# Patient Record
Sex: MALE | Race: WHITE | Employment: OTHER | URBAN - NONMETROPOLITAN AREA
[De-identification: names, ages, dates, MRNs, and addresses within clinical notes are randomized per-mention and may not be internally consistent; named-entity substitution may affect disease eponyms.]

---

## 2017-04-17 DIAGNOSIS — I10 ESSENTIAL HYPERTENSION: ICD-10-CM

## 2017-04-18 RX ORDER — LISINOPRIL 10 MG/1
TABLET ORAL
Qty: 30 TABLET | Refills: 0 | Status: SHIPPED | OUTPATIENT
Start: 2017-04-18 | End: 2017-05-08 | Stop reason: SDUPTHER

## 2017-05-08 DIAGNOSIS — F41.9 ANXIETY: ICD-10-CM

## 2017-05-08 DIAGNOSIS — I10 ESSENTIAL HYPERTENSION: ICD-10-CM

## 2017-05-08 DIAGNOSIS — F32.A DEPRESSION: ICD-10-CM

## 2017-05-08 RX ORDER — LISINOPRIL 10 MG/1
TABLET ORAL
Qty: 30 TABLET | Refills: 0 | Status: SHIPPED | OUTPATIENT
Start: 2017-05-08 | End: 2017-06-12 | Stop reason: SDUPTHER

## 2017-05-08 RX ORDER — CITALOPRAM 40 MG/1
TABLET ORAL
Qty: 90 TABLET | Refills: 0 | Status: SHIPPED | OUTPATIENT
Start: 2017-05-08 | End: 2017-06-20 | Stop reason: SDUPTHER

## 2017-06-12 DIAGNOSIS — I10 ESSENTIAL HYPERTENSION: ICD-10-CM

## 2017-06-12 RX ORDER — LISINOPRIL 10 MG/1
TABLET ORAL
Qty: 30 TABLET | Refills: 0 | Status: SHIPPED | OUTPATIENT
Start: 2017-06-12 | End: 2017-06-20 | Stop reason: SDUPTHER

## 2017-06-12 RX ORDER — ATORVASTATIN CALCIUM 20 MG/1
TABLET, FILM COATED ORAL
Qty: 30 TABLET | Refills: 0 | Status: SHIPPED | OUTPATIENT
Start: 2017-06-12 | End: 2017-06-20 | Stop reason: SDUPTHER

## 2017-06-20 ENCOUNTER — OFFICE VISIT (OUTPATIENT)
Dept: PRIMARY CARE CLINIC | Age: 77
End: 2017-06-20
Payer: COMMERCIAL

## 2017-06-20 ENCOUNTER — TELEPHONE (OUTPATIENT)
Dept: PRIMARY CARE CLINIC | Age: 77
End: 2017-06-20

## 2017-06-20 VITALS
DIASTOLIC BLOOD PRESSURE: 62 MMHG | BODY MASS INDEX: 31.39 KG/M2 | HEIGHT: 72 IN | TEMPERATURE: 96.8 F | HEART RATE: 58 BPM | WEIGHT: 231.75 LBS | OXYGEN SATURATION: 97 % | SYSTOLIC BLOOD PRESSURE: 86 MMHG

## 2017-06-20 DIAGNOSIS — N40.1 BENIGN NON-NODULAR PROSTATIC HYPERPLASIA WITH LOWER URINARY TRACT SYMPTOMS: ICD-10-CM

## 2017-06-20 DIAGNOSIS — Z23 NEED FOR DTAP VACCINATION: ICD-10-CM

## 2017-06-20 DIAGNOSIS — I25.10 CORONARY ARTERY DISEASE INVOLVING NATIVE HEART WITHOUT ANGINA PECTORIS, UNSPECIFIED VESSEL OR LESION TYPE: ICD-10-CM

## 2017-06-20 DIAGNOSIS — Z23 NEED FOR PNEUMOCOCCAL VACCINATION: ICD-10-CM

## 2017-06-20 DIAGNOSIS — L82.1 SEBORRHEIC KERATOSIS: ICD-10-CM

## 2017-06-20 DIAGNOSIS — E78.2 MIXED HYPERLIPIDEMIA: ICD-10-CM

## 2017-06-20 DIAGNOSIS — F32.A DEPRESSION, UNSPECIFIED DEPRESSION TYPE: ICD-10-CM

## 2017-06-20 DIAGNOSIS — I47.1 SVT (SUPRAVENTRICULAR TACHYCARDIA) (HCC): ICD-10-CM

## 2017-06-20 DIAGNOSIS — Z95.5 S/P CORONARY ARTERY STENT PLACEMENT: ICD-10-CM

## 2017-06-20 DIAGNOSIS — F41.9 ANXIETY: ICD-10-CM

## 2017-06-20 DIAGNOSIS — Z00.00 ENCOUNTER FOR PREVENTATIVE ADULT HEALTH CARE EXAMINATION: ICD-10-CM

## 2017-06-20 DIAGNOSIS — Z00.00 ENCOUNTER FOR PREVENTATIVE ADULT HEALTH CARE EXAMINATION: Primary | ICD-10-CM

## 2017-06-20 DIAGNOSIS — I10 ESSENTIAL HYPERTENSION: ICD-10-CM

## 2017-06-20 LAB
ALBUMIN SERPL-MCNC: 4 G/DL (ref 3.5–5.2)
ALP BLD-CCNC: 95 U/L (ref 40–130)
ALT SERPL-CCNC: 17 U/L (ref 5–41)
ANION GAP SERPL CALCULATED.3IONS-SCNC: 17 MMOL/L (ref 7–19)
AST SERPL-CCNC: 18 U/L (ref 5–40)
BASOPHILS ABSOLUTE: 0.1 K/UL (ref 0–0.2)
BASOPHILS RELATIVE PERCENT: 0.7 % (ref 0–1)
BILIRUB SERPL-MCNC: 0.5 MG/DL (ref 0.2–1.2)
BUN BLDV-MCNC: 23 MG/DL (ref 8–23)
CALCIUM SERPL-MCNC: 9.2 MG/DL (ref 8.8–10.2)
CHLORIDE BLD-SCNC: 104 MMOL/L (ref 98–111)
CHOLESTEROL, TOTAL: 137 MG/DL (ref 160–199)
CO2: 20 MMOL/L (ref 22–29)
CREAT SERPL-MCNC: 0.7 MG/DL (ref 0.5–1.2)
CREATININE URINE: 234.5 MG/DL (ref 4.2–622)
EOSINOPHILS ABSOLUTE: 0.2 K/UL (ref 0–0.6)
EOSINOPHILS RELATIVE PERCENT: 2.2 % (ref 0–5)
GFR NON-AFRICAN AMERICAN: >60
GLUCOSE BLD-MCNC: 113 MG/DL (ref 74–109)
HCT VFR BLD CALC: 45.2 % (ref 42–52)
HDLC SERPL-MCNC: 45 MG/DL (ref 55–121)
HEMOGLOBIN: 15 G/DL (ref 14–18)
LDL CHOLESTEROL CALCULATED: 72 MG/DL
LYMPHOCYTES ABSOLUTE: 1.5 K/UL (ref 1.1–4.5)
LYMPHOCYTES RELATIVE PERCENT: 20.1 % (ref 20–40)
MCH RBC QN AUTO: 30.5 PG (ref 27–31)
MCHC RBC AUTO-ENTMCNC: 33.2 G/DL (ref 33–37)
MCV RBC AUTO: 91.9 FL (ref 80–94)
MICROALBUMIN UR-MCNC: <1.2 MG/DL (ref 0–19)
MICROALBUMIN/CREAT UR-RTO: NORMAL MG/G
MONOCYTES ABSOLUTE: 0.9 K/UL (ref 0–0.9)
MONOCYTES RELATIVE PERCENT: 12 % (ref 0–10)
NEUTROPHILS ABSOLUTE: 4.7 K/UL (ref 1.5–7.5)
NEUTROPHILS RELATIVE PERCENT: 64.5 % (ref 50–65)
PDW BLD-RTO: 13.2 % (ref 11.5–14.5)
PLATELET # BLD: 249 K/UL (ref 130–400)
PMV BLD AUTO: 8.9 FL (ref 9.4–12.4)
POTASSIUM SERPL-SCNC: 4.5 MMOL/L (ref 3.5–5)
RBC # BLD: 4.92 M/UL (ref 4.7–6.1)
SODIUM BLD-SCNC: 141 MMOL/L (ref 136–145)
T4 FREE: 0.9 NG/ML (ref 0.9–1.7)
TOTAL PROTEIN: 7.3 G/DL (ref 6.6–8.7)
TRIGL SERPL-MCNC: 101 MG/DL (ref 150–199)
TSH SERPL DL<=0.05 MIU/L-ACNC: 2.62 UIU/ML (ref 0.27–4.2)
WBC # BLD: 7.3 K/UL (ref 4.8–10.8)

## 2017-06-20 PROCEDURE — G0439 PPPS, SUBSEQ VISIT: HCPCS | Performed by: PEDIATRICS

## 2017-06-20 RX ORDER — CITALOPRAM 40 MG/1
40 TABLET ORAL DAILY
Qty: 90 TABLET | Refills: 3 | Status: SHIPPED | OUTPATIENT
Start: 2017-06-20 | End: 2018-08-08 | Stop reason: SDUPTHER

## 2017-06-20 RX ORDER — ATORVASTATIN CALCIUM 20 MG/1
20 TABLET, FILM COATED ORAL NIGHTLY
Qty: 90 TABLET | Refills: 3 | Status: SHIPPED | OUTPATIENT
Start: 2017-06-20 | End: 2017-07-18

## 2017-06-20 RX ORDER — FINASTERIDE 5 MG/1
5 TABLET, FILM COATED ORAL DAILY
Qty: 90 TABLET | Refills: 3 | Status: SHIPPED | OUTPATIENT
Start: 2017-06-20 | End: 2018-08-08 | Stop reason: SDUPTHER

## 2017-06-20 RX ORDER — ALFUZOSIN HYDROCHLORIDE 10 MG/1
10 TABLET, EXTENDED RELEASE ORAL DAILY
Qty: 90 TABLET | Refills: 3 | Status: CANCELLED | OUTPATIENT
Start: 2017-06-20

## 2017-06-20 RX ORDER — LISINOPRIL 10 MG/1
TABLET ORAL
Qty: 90 TABLET | Refills: 3 | Status: SHIPPED | OUTPATIENT
Start: 2017-06-20 | End: 2018-07-16 | Stop reason: SDUPTHER

## 2017-06-20 ASSESSMENT — PATIENT HEALTH QUESTIONNAIRE - PHQ9
SUM OF ALL RESPONSES TO PHQ9 QUESTIONS 1 & 2: 0
2. FEELING DOWN, DEPRESSED OR HOPELESS: 0
SUM OF ALL RESPONSES TO PHQ QUESTIONS 1-9: 0
1. LITTLE INTEREST OR PLEASURE IN DOING THINGS: 0

## 2017-06-20 ASSESSMENT — ENCOUNTER SYMPTOMS
COUGH: 0
EYE PAIN: 0
ABDOMINAL PAIN: 0
SHORTNESS OF BREATH: 0
EYE REDNESS: 0
TROUBLE SWALLOWING: 0
VOMITING: 0
SORE THROAT: 0
EYE ITCHING: 0
CONSTIPATION: 0
BLOOD IN STOOL: 0
SINUS PRESSURE: 0
DIARRHEA: 0
BACK PAIN: 0
CHEST TIGHTNESS: 0
WHEEZING: 0
NAUSEA: 0
EYE DISCHARGE: 0

## 2017-06-22 ENCOUNTER — TELEPHONE (OUTPATIENT)
Dept: PRIMARY CARE CLINIC | Age: 77
End: 2017-06-22

## 2017-06-23 ENCOUNTER — TELEPHONE (OUTPATIENT)
Dept: PRIMARY CARE CLINIC | Age: 77
End: 2017-06-23

## 2017-06-30 ENCOUNTER — TELEPHONE (OUTPATIENT)
Dept: PRIMARY CARE CLINIC | Age: 77
End: 2017-06-30

## 2017-06-30 RX ORDER — AZITHROMYCIN 250 MG/1
TABLET, FILM COATED ORAL
Qty: 1 PACKET | Refills: 0 | Status: SHIPPED | OUTPATIENT
Start: 2017-06-30 | End: 2017-07-10

## 2017-07-18 RX ORDER — ATORVASTATIN CALCIUM 20 MG/1
20 TABLET, FILM COATED ORAL DAILY
Qty: 30 TABLET | Refills: 11 | Status: SHIPPED | OUTPATIENT
Start: 2017-07-18 | End: 2018-07-10 | Stop reason: SDUPTHER

## 2017-09-18 RX ORDER — OMEPRAZOLE 40 MG/1
CAPSULE, DELAYED RELEASE ORAL
Qty: 30 CAPSULE | Refills: 11 | Status: SHIPPED | OUTPATIENT
Start: 2017-09-18 | End: 2018-09-12 | Stop reason: SDUPTHER

## 2017-09-27 ENCOUNTER — OFFICE VISIT (OUTPATIENT)
Dept: CARDIOLOGY | Age: 77
End: 2017-09-27
Payer: MEDICARE

## 2017-09-27 VITALS
SYSTOLIC BLOOD PRESSURE: 130 MMHG | DIASTOLIC BLOOD PRESSURE: 60 MMHG | HEART RATE: 60 BPM | WEIGHT: 230 LBS | HEIGHT: 72 IN | BODY MASS INDEX: 31.15 KG/M2

## 2017-09-27 DIAGNOSIS — I10 ESSENTIAL HYPERTENSION: ICD-10-CM

## 2017-09-27 DIAGNOSIS — I25.10 CORONARY ARTERY DISEASE INVOLVING NATIVE HEART WITHOUT ANGINA PECTORIS, UNSPECIFIED VESSEL OR LESION TYPE: Primary | ICD-10-CM

## 2017-09-27 DIAGNOSIS — I05.9 MITRAL VALVE DISORDER: ICD-10-CM

## 2017-09-27 DIAGNOSIS — I47.1 SVT (SUPRAVENTRICULAR TACHYCARDIA) (HCC): ICD-10-CM

## 2017-09-27 PROCEDURE — 4040F PNEUMOC VAC/ADMIN/RCVD: CPT | Performed by: CLINICAL NURSE SPECIALIST

## 2017-09-27 PROCEDURE — 1036F TOBACCO NON-USER: CPT | Performed by: CLINICAL NURSE SPECIALIST

## 2017-09-27 PROCEDURE — G8427 DOCREV CUR MEDS BY ELIG CLIN: HCPCS | Performed by: CLINICAL NURSE SPECIALIST

## 2017-09-27 PROCEDURE — 1123F ACP DISCUSS/DSCN MKR DOCD: CPT | Performed by: CLINICAL NURSE SPECIALIST

## 2017-09-27 PROCEDURE — 99213 OFFICE O/P EST LOW 20 MIN: CPT | Performed by: CLINICAL NURSE SPECIALIST

## 2017-09-27 PROCEDURE — 93000 ELECTROCARDIOGRAM COMPLETE: CPT | Performed by: CLINICAL NURSE SPECIALIST

## 2017-09-27 PROCEDURE — G8598 ASA/ANTIPLAT THER USED: HCPCS | Performed by: CLINICAL NURSE SPECIALIST

## 2017-09-27 PROCEDURE — G8417 CALC BMI ABV UP PARAM F/U: HCPCS | Performed by: CLINICAL NURSE SPECIALIST

## 2017-09-27 ASSESSMENT — ENCOUNTER SYMPTOMS
BLURRED VISION: 0
COUGH: 0
HEARTBURN: 0
NAUSEA: 0
ORTHOPNEA: 0
VOMITING: 0
SHORTNESS OF BREATH: 0

## 2017-12-04 RX ORDER — METOPROLOL SUCCINATE 50 MG/1
25 TABLET, EXTENDED RELEASE ORAL DAILY
Qty: 30 TABLET | Refills: 10 | Status: SHIPPED | OUTPATIENT
Start: 2017-12-04 | End: 2017-12-04 | Stop reason: SDUPTHER

## 2017-12-04 RX ORDER — METOPROLOL SUCCINATE 50 MG/1
25 TABLET, EXTENDED RELEASE ORAL DAILY
Qty: 30 TABLET | Refills: 10 | Status: SHIPPED | OUTPATIENT
Start: 2017-12-04 | End: 2019-01-16 | Stop reason: SDUPTHER

## 2018-06-06 ENCOUNTER — APPOINTMENT (OUTPATIENT)
Dept: GENERAL RADIOLOGY | Age: 78
End: 2018-06-06
Payer: MEDICARE

## 2018-06-06 ENCOUNTER — HOSPITAL ENCOUNTER (EMERGENCY)
Age: 78
Discharge: HOME | End: 2018-06-06
Attending: EMERGENCY MEDICINE
Payer: MEDICARE

## 2018-06-06 VITALS
TEMPERATURE: 98.2 F | WEIGHT: 225 LBS | BODY MASS INDEX: 29.82 KG/M2 | HEART RATE: 79 BPM | SYSTOLIC BLOOD PRESSURE: 105 MMHG | RESPIRATION RATE: 20 BRPM | OXYGEN SATURATION: 92 % | DIASTOLIC BLOOD PRESSURE: 85 MMHG | HEIGHT: 73 IN

## 2018-06-06 DIAGNOSIS — R00.2 PALPITATIONS: Primary | ICD-10-CM

## 2018-06-06 DIAGNOSIS — R07.9 CHEST PAIN, UNSPECIFIED TYPE: ICD-10-CM

## 2018-06-06 LAB
ANION GAP SERPL CALCULATED.3IONS-SCNC: 13 MMOL/L (ref 7–19)
APTT: 24.8 SEC (ref 26–36.2)
BUN BLDV-MCNC: 27 MG/DL (ref 8–23)
CALCIUM SERPL-MCNC: 9.5 MG/DL (ref 8.8–10.2)
CHLORIDE BLD-SCNC: 104 MMOL/L (ref 98–111)
CO2: 27 MMOL/L (ref 22–29)
CREAT SERPL-MCNC: 0.8 MG/DL (ref 0.5–1.2)
GFR NON-AFRICAN AMERICAN: >60
GLUCOSE BLD-MCNC: 115 MG/DL (ref 74–109)
HCT VFR BLD CALC: 48.8 % (ref 42–52)
HEMOGLOBIN: 15.7 G/DL (ref 14–18)
INR BLD: 0.98 (ref 0.88–1.18)
MCH RBC QN AUTO: 29.3 PG (ref 27–31)
MCHC RBC AUTO-ENTMCNC: 32.2 G/DL (ref 33–37)
MCV RBC AUTO: 91.2 FL (ref 80–94)
PDW BLD-RTO: 13.2 % (ref 11.5–14.5)
PERFORMED ON: NORMAL
PERFORMED ON: NORMAL
PLATELET # BLD: 290 K/UL (ref 130–400)
PMV BLD AUTO: 8.7 FL (ref 9.4–12.4)
POC TROPONIN I: 0.01 NG/ML (ref 0–0.08)
POC TROPONIN I: 0.02 NG/ML (ref 0–0.08)
POTASSIUM SERPL-SCNC: 4.9 MMOL/L (ref 3.5–5)
PROTHROMBIN TIME: 12.9 SEC (ref 12–14.6)
RBC # BLD: 5.35 M/UL (ref 4.7–6.1)
SODIUM BLD-SCNC: 144 MMOL/L (ref 136–145)
WBC # BLD: 9.6 K/UL (ref 4.8–10.8)

## 2018-06-06 PROCEDURE — 84484 ASSAY OF TROPONIN QUANT: CPT

## 2018-06-06 PROCEDURE — 85610 PROTHROMBIN TIME: CPT

## 2018-06-06 PROCEDURE — 93005 ELECTROCARDIOGRAM TRACING: CPT

## 2018-06-06 PROCEDURE — 99285 EMERGENCY DEPT VISIT HI MDM: CPT

## 2018-06-06 PROCEDURE — 71046 X-RAY EXAM CHEST 2 VIEWS: CPT

## 2018-06-06 PROCEDURE — 85027 COMPLETE CBC AUTOMATED: CPT

## 2018-06-06 PROCEDURE — 80048 BASIC METABOLIC PNL TOTAL CA: CPT

## 2018-06-06 PROCEDURE — 85730 THROMBOPLASTIN TIME PARTIAL: CPT

## 2018-06-06 PROCEDURE — 99284 EMERGENCY DEPT VISIT MOD MDM: CPT | Performed by: EMERGENCY MEDICINE

## 2018-06-06 PROCEDURE — 6370000000 HC RX 637 (ALT 250 FOR IP): Performed by: EMERGENCY MEDICINE

## 2018-06-06 PROCEDURE — 36415 COLL VENOUS BLD VENIPUNCTURE: CPT

## 2018-06-06 RX ORDER — ASPIRIN 81 MG/1
324 TABLET, CHEWABLE ORAL ONCE
Status: COMPLETED | OUTPATIENT
Start: 2018-06-06 | End: 2018-06-06

## 2018-06-06 RX ADMIN — ASPIRIN 81 MG 324 MG: 81 TABLET ORAL at 19:25

## 2018-06-06 ASSESSMENT — ENCOUNTER SYMPTOMS
COUGH: 1
VOMITING: 0
CONSTIPATION: 0
RHINORRHEA: 0
SORE THROAT: 0
ABDOMINAL PAIN: 0
CHEST TIGHTNESS: 0
BACK PAIN: 0
BLOOD IN STOOL: 0
NAUSEA: 0
DIARRHEA: 0
SHORTNESS OF BREATH: 0
TROUBLE SWALLOWING: 0
ABDOMINAL DISTENTION: 0

## 2018-06-06 ASSESSMENT — HEART SCORE: ECG: 1

## 2018-06-08 ENCOUNTER — TELEPHONE (OUTPATIENT)
Dept: PRIMARY CARE CLINIC | Age: 78
End: 2018-06-08

## 2018-06-09 LAB
EKG P AXIS: 23 DEGREES
EKG P AXIS: NORMAL DEGREES
EKG P-R INTERVAL: 248 MS
EKG P-R INTERVAL: NORMAL MS
EKG Q-T INTERVAL: 406 MS
EKG Q-T INTERVAL: 414 MS
EKG QRS DURATION: 90 MS
EKG QRS DURATION: 90 MS
EKG QTC CALCULATION (BAZETT): 433 MS
EKG QTC CALCULATION (BAZETT): 435 MS
EKG T AXIS: 43 DEGREES
EKG T AXIS: 51 DEGREES

## 2018-07-12 RX ORDER — ATORVASTATIN CALCIUM 20 MG/1
TABLET, FILM COATED ORAL
Qty: 30 TABLET | Refills: 0 | Status: SHIPPED | OUTPATIENT
Start: 2018-07-12 | End: 2018-08-08 | Stop reason: SDUPTHER

## 2018-07-16 DIAGNOSIS — I10 ESSENTIAL HYPERTENSION: ICD-10-CM

## 2018-07-16 RX ORDER — LISINOPRIL 10 MG/1
TABLET ORAL
Qty: 30 TABLET | Refills: 0 | Status: SHIPPED | OUTPATIENT
Start: 2018-07-16 | End: 2018-08-08 | Stop reason: SDUPTHER

## 2018-08-01 ENCOUNTER — OFFICE VISIT (OUTPATIENT)
Dept: PRIMARY CARE CLINIC | Age: 78
End: 2018-08-01
Payer: MEDICARE

## 2018-08-01 VITALS
WEIGHT: 230 LBS | HEART RATE: 54 BPM | DIASTOLIC BLOOD PRESSURE: 72 MMHG | HEIGHT: 72 IN | SYSTOLIC BLOOD PRESSURE: 136 MMHG | BODY MASS INDEX: 31.15 KG/M2 | TEMPERATURE: 97.7 F | OXYGEN SATURATION: 97 %

## 2018-08-01 DIAGNOSIS — I48.0 PAROXYSMAL ATRIAL FIBRILLATION (HCC): ICD-10-CM

## 2018-08-01 DIAGNOSIS — N40.1 BENIGN PROSTATIC HYPERPLASIA WITH URINARY HESITANCY: ICD-10-CM

## 2018-08-01 DIAGNOSIS — F41.9 ANXIETY: ICD-10-CM

## 2018-08-01 DIAGNOSIS — Z95.5 S/P CORONARY ARTERY STENT PLACEMENT: ICD-10-CM

## 2018-08-01 DIAGNOSIS — R39.11 BENIGN PROSTATIC HYPERPLASIA WITH URINARY HESITANCY: ICD-10-CM

## 2018-08-01 DIAGNOSIS — Z00.00 VISIT FOR PREVENTIVE HEALTH EXAMINATION: Primary | ICD-10-CM

## 2018-08-01 DIAGNOSIS — I25.10 CORONARY ARTERY DISEASE INVOLVING NATIVE CORONARY ARTERY OF NATIVE HEART WITHOUT ANGINA PECTORIS: ICD-10-CM

## 2018-08-01 DIAGNOSIS — K21.9 GASTROESOPHAGEAL REFLUX DISEASE, ESOPHAGITIS PRESENCE NOT SPECIFIED: ICD-10-CM

## 2018-08-01 DIAGNOSIS — I47.1 SVT (SUPRAVENTRICULAR TACHYCARDIA) (HCC): ICD-10-CM

## 2018-08-01 DIAGNOSIS — I10 ESSENTIAL HYPERTENSION: ICD-10-CM

## 2018-08-01 DIAGNOSIS — E78.2 MIXED HYPERLIPIDEMIA: ICD-10-CM

## 2018-08-01 PROCEDURE — 4040F PNEUMOC VAC/ADMIN/RCVD: CPT | Performed by: PEDIATRICS

## 2018-08-01 PROCEDURE — G8598 ASA/ANTIPLAT THER USED: HCPCS | Performed by: PEDIATRICS

## 2018-08-01 PROCEDURE — 1101F PT FALLS ASSESS-DOCD LE1/YR: CPT | Performed by: PEDIATRICS

## 2018-08-01 PROCEDURE — 1123F ACP DISCUSS/DSCN MKR DOCD: CPT | Performed by: PEDIATRICS

## 2018-08-01 PROCEDURE — G8417 CALC BMI ABV UP PARAM F/U: HCPCS | Performed by: PEDIATRICS

## 2018-08-01 PROCEDURE — 93000 ELECTROCARDIOGRAM COMPLETE: CPT | Performed by: PEDIATRICS

## 2018-08-01 PROCEDURE — G0439 PPPS, SUBSEQ VISIT: HCPCS | Performed by: PEDIATRICS

## 2018-08-01 PROCEDURE — 99214 OFFICE O/P EST MOD 30 MIN: CPT | Performed by: PEDIATRICS

## 2018-08-01 PROCEDURE — G8427 DOCREV CUR MEDS BY ELIG CLIN: HCPCS | Performed by: PEDIATRICS

## 2018-08-01 PROCEDURE — 1036F TOBACCO NON-USER: CPT | Performed by: PEDIATRICS

## 2018-08-01 ASSESSMENT — ENCOUNTER SYMPTOMS
EYE PAIN: 0
NAUSEA: 0
DIARRHEA: 0
CONSTIPATION: 0
COUGH: 0
WHEEZING: 0
SINUS PRESSURE: 0
EYE ITCHING: 0
CHEST TIGHTNESS: 0
EYE REDNESS: 0
SHORTNESS OF BREATH: 0
BACK PAIN: 0
VOMITING: 0
ABDOMINAL PAIN: 0
SORE THROAT: 0
EYE DISCHARGE: 0
TROUBLE SWALLOWING: 0
BLOOD IN STOOL: 0

## 2018-08-01 ASSESSMENT — PATIENT HEALTH QUESTIONNAIRE - PHQ9: SUM OF ALL RESPONSES TO PHQ QUESTIONS 1-9: 0

## 2018-08-01 ASSESSMENT — LIFESTYLE VARIABLES: HOW OFTEN DO YOU HAVE A DRINK CONTAINING ALCOHOL: 0

## 2018-08-01 ASSESSMENT — ANXIETY QUESTIONNAIRES: GAD7 TOTAL SCORE: 0

## 2018-08-01 NOTE — PATIENT INSTRUCTIONS
Personalized Preventive Plan for Adelfo Angeles - 8/1/2018  Medicare offers a range of preventive health benefits. Some of the tests and screenings are paid in full while other may be subject to a deductible, co-insurance, and/or copay. Some of these benefits include a comprehensive review of your medical history including lifestyle, illnesses that may run in your family, and various assessments and screenings as appropriate. After reviewing your medical record and screening and assessments performed today your provider may have ordered immunizations, labs, imaging, and/or referrals for you. A list of these orders (if applicable) as well as your Preventive Care list are included within your After Visit Summary for your review. Other Preventive Recommendations:    · A preventive eye exam performed by an eye specialist is recommended every 1-2 years to screen for glaucoma; cataracts, macular degeneration, and other eye disorders. · A preventive dental visit is recommended every 6 months. · Try to get at least 150 minutes of exercise per week or 10,000 steps per day on a pedometer . · Order or download the FREE \"Exercise & Physical Activity: Your Everyday Guide\" from [employer]. Call 4-280.793.1003 or search [employer] online. · You need 7077-6440 mg of calcium and 4096-9161 IU of vitamin D per day. It is possible to meet your calcium requirement with diet alone, but a vitamin D supplement is usually necessary to meet this goal.  · When exposed to the sun, use a sunscreen that protects against both UVA and UVB radiation with an SPF of 30 or greater. Reapply every 2 to 3 hours or after sweating, drying off with a towel, or swimming. · Always wear a seat belt when traveling in a car. Always wear a helmet when riding a bicycle or motorcycle.   Patient Education        Well Visit, Over 72: Care Instructions  Your Care Instructions    Physical exams can help you Your electronic copy will then be available wherever you have a connection to the Internet. In most cases, doctors will respect your wishes even if you have a form from a different state. You don't need an  to complete a living will. But legal advice can be helpful if your state's laws are unclear, your health history is complicated, or your family can't agree on what should be in your living will. You can change your living will at any time. Some people find that their wishes about end-of-life care change as their health changes. In addition to making a living will, think about completing a medical power of  form. This form lets you name the person you want to make end-of-life treatment decisions for you (your \"health care agent\") if you're not able to. Many hospitals and nursing homes will give you the forms you need to complete a living will and a medical power of . Your living will is used only if you can't make or communicate decisions for yourself anymore. If you become able to make decisions again, you can accept or refuse any treatment, no matter what you wrote in your living will. Your state may offer an online registry. This is a place where you can store your living will online so the doctors and nurses who need to treat you can find it right away. What should you think about when creating a living will? Talk about your end-of-life wishes with your family members and your doctor. Let them know what you want. That way the people making decisions for you won't be surprised by your choices. Think about these questions as you make your living will:  Do you know enough about life support methods that might be used? If not, talk to your doctor so you know what might be done if you can't breathe on your own, your heart stops, or you're unable to swallow. What things would you still want to be able to do after you receive life-support methods? Would you want to be able to walk?  To speak? To eat on your own? To live without the help of machines? If you have a choice, where do you want to be cared for? In your home? At a hospital or nursing home? Do you want certain Zoroastrianism practices performed if you become very ill? If you have a choice at the end of your life, where would you prefer to die? At home? In a hospital or nursing home? Somewhere else? Would you prefer to be buried or cremated? Do you want your organs to be donated after you die? What should you do with your living will? Make sure that your family members and your health care agent have copies of your living will. Give your doctor a copy of your living will to keep in your medical record. If you have more than one doctor, make sure that each one has a copy. You may want to put a copy of your living will where it can be easily found. Where can you learn more? Go to https://Data ExpeditionpeHOLLR.Fooala. org and sign in to your Netflix account. Enter M230 in the [employer] box to learn more about \"Learning About Living Perrokatya. \"     If you do not have an account, please click on the \"Sign Up Now\" link. Current as of: October 6, 2017  Content Version: 11.6  © 2657-8279 [employer]. Care instructions adapted under license by [facility] ([facility]). If you have questions about a medical condition or this instruction, always ask your healthcare professional. [place] [address] any warranty or liability for your use of this information. Patient Education        Advance Directives: Care Instructions  Your Care Instructions  An advance directive is a legal way to state your wishes at the end of your life. It tells your family and your doctor what to do if you can no longer say what you want. There are two main types of advance directives. You can change them any time that your wishes change.   A living will tells your family and your doctor your wishes about life support and other treatment. A durable power of  for health care lets you name a person to make treatment decisions for you when you can't speak for yourself. This person is called a health care agent. If you do not have an advance directive, decisions about your medical care may be made by a doctor or a  who doesn't know you. It may help to think of an advance directive as a gift to the people who care for you. If you have one, they won't have to make tough decisions by themselves. Follow-up care is a key part of your treatment and safety. Be sure to make and go to all appointments, and call your doctor if you are having problems. It's also a good idea to know your test results and keep a list of the medicines you take. How can you care for yourself at home? Discuss your wishes with your loved ones and your doctor. This way, there are no surprises. Many states have a unique form. Or you might use a universal form that has been approved by many states. This kind of form can sometimes be completed and stored online. Your electronic copy will then be available wherever you have a connection to the Internet. In most cases, doctors will respect your wishes even if you have a form from a different state. You don't need a  to do an advance directive. But you may want to get legal advice. Think about these questions when you prepare an advance directive: Who do you want to make decisions about your medical care if you are not able to? Many people choose a family member or close friend. Do you know enough about life support methods that might be used? If not, talk to your doctor so you understand. What are you most afraid of that might happen? You might be afraid of having pain, losing your independence, or being kept alive by machines. Where would you prefer to die? Choices include your home, a hospital, or a nursing home.   Would you like to have information about hospice care to support you and your and have more energy to do all the things you like to do. Focus better at school or work and perform better in sports. Feel, think, and sleep better. Reach and stay at a healthy weight. Lose fat and build lean muscle. Lower your risk for serious health problems. Keep your bones, muscles, and joints strong. Being fit lets you do more physical activity. And it lets you work out harder without as much effort. How can you make physical activity part of your life? Get at least 30 minutes of exercise on most days of the week. Walking is a good choice. You also may want to do other activities, such as running, swimming, cycling, or playing tennis or team sports. Pick activities that you like-ones that make your heart beat faster, your muscles stronger, and your muscles and joints more flexible. If you find more than one thing you like doing, do them all. You don't have to do the same thing every day. Get your heart pumping every day. Any activity that makes your heart beat faster and keeps it at that rate for a while counts. Here are some great ways to get your heart beating faster:  Go for a brisk walk, run, or bike ride. Go for a hike or swim. Go in-line skating. Play a game of touch football, basketball, or soccer. Ride a bike. Play tennis or racquetball. Climb stairs. Even some household chores can be aerobic-just do them at a faster pace. Vacuuming, raking or mowing the lawn, sweeping the garage, and washing and waxing the car all can help get your heart rate up. Strengthen your muscles during the week. You don't have to lift heavy weights or grow big, bulky muscles to get stronger. Doing a few simple activities that make your muscles work against, or \"resist,\" something can help you get stronger. For example, you can:  Do push-ups or sit-ups, which use your own body weight as resistance. Lift weights or dumbbells or use stretch bands at home or in a gym or community center.   Stretch your muscles information. Patient Education        Learning About Hearing Aids  What is a hearing aid? A hearing aid makes sounds louder. It can help some people with hearing problems to hear better. Hearing aids do not restore normal hearing. But they can make it easier to communicate. There are different types of hearing aids. Analog adjustable hearing aids make both speech and other sounds louder in the same amount. Your doctor can adjust them to fit your hearing. You can control loudness. These cost less than the other types of hearing aids. Analog programmable hearing aids have a computer chip that your doctor can program to fit your hearing. They can be set up for different places or events. For example, you can have a setting for quiet one-on-one conversations and another for noisy times like a dinner party in a restaurant. You can change hearing programs with a remote control. Digital programmable hearing aids can adjust themselves to work best where you are at any time. You also have more choices in setting them up than with analog hearing aids. Bone-anchored hearing systems transmit sound through the skull. This type of hearing aid is permanently implanted in the skull bone. It may work for people who do not benefit from other types of hearing aids. There are also different styles of hearing aids. A behind-the-ear (BTE) hearing aid connects to a plastic ear mold that fits inside the outer ear. BTE hearing aids are used for all levels of hearing loss, especially very severe hearing loss. They may be better for children for safety and growth reasons. Poorly fitting BTE ear molds or a buildup of earwax may cause a whistling sound (feedback). An in-the-ear (ITE) hearing aid fits in the outer part of the ear. It can be used by people with mild to severe hearing loss. ITE hearing aids can be used with other hearing devices, such as a telecoil that improves hearing during phone calls.  ITE hearing aids can be damaged by earwax and fluid draining from the ear. Their small size may be hard for some people to handle. They are not often used in children because the case must be replaced as the child grows. An in-the-canal (ITC) hearing aid fits into the ear canal. ITC hearing aids are used by people with mild to moderate hearing loss. They are made to fit the shape and the size of your ear canal. They can be damaged by earwax and fluid draining from the ear. Their small size may be hard for some people to handle. They are not made for children. With a bone-anchored hearing system, the sound processor sits behind the ear. No part of the system is within the ear itself. If your doctor thinks that you have hearing loss, you will be referred to an audiologist to do hearing tests. He or she can help you decide what type and style of hearing aid may be best for you. What else should I know about hearing aids? Find out if your insurance covers hearing aids. They can be expensive. Different types of hearing aids come with different costs. Also find out about a warranty or return policy in case you are not happy with your hearing aids. Follow-up care is a key part of your treatment and safety. Be sure to make and go to all appointments, and call your doctor if you are having problems. It's also a good idea to know your test results and keep a list of the medicines you take. Where can you learn more? Go to https://Cloud.CMpedelilahewAfterCollege.Paylocity. org and sign in to your ID90T account. Enter W706 in the [facility] box to learn more about \"Learning About Hearing Aids. \"     If you do not have an account, please click on the \"Sign Up Now\" link. Current as of: May 12, 2017  Content Version: 11.6  © 5617-8290 [employer]. Care instructions adapted under license by [facility] ([facility]).  If you have questions about a medical condition or this instruction, always ask your healthcare professional. Ranjeet Loomis, [facility] disclaims any warranty or liability for your use of this information. Patient Education        Learning About Vision Tests  What are vision tests? The four most common vision tests are visual acuity tests, refraction, visual field tests, and color vision tests. Visual acuity (sharpness) tests are used: To see if you need glasses or contact lenses. To monitor an eye problem. To check an eye injury. Visual acuity tests are done as part of routine exams. You may also have this test when you get your 's license or apply for some types of jobs. Refraction is done: To find the right prescription for glasses and contact lenses. Visual field tests are used: To check for vision loss in any area of your range of vision. To screen for certain eye diseases. To look for nerve damage after a stroke, head injury, or other problem that could reduce blood flow to the brain. Color vision tests are used: To check for color blindness. Color vision is often tested as part of a routine exam. You may also have this test when you apply for a job where recognizing different colors is important, such as , electronics, or the [employer]. How are vision tests done? Visual acuity test  You cover one eye at a time. You read aloud from a chart across the room. You read aloud from a small card that you hold in your hand. Refraction  You look into a special device. The device puts lenses of different strengths in front of each eye to see how strong your glasses or contact lenses need to be. Visual field tests  Your doctor may have you look through special machines. Or your doctor may simply have you stare straight ahead while he or she moves a finger into and out of your field of vision. Color vision test  You look at pieces of printed test patterns in various colors. You say what number or symbol you see. Your doctor may have you trace the number or symbol using a pointer.   How do these tests feel? You shouldn't feel any discomfort during these tests. Follow-up care is a key part of your treatment and safety. Be sure to make and go to all appointments, and call your doctor if you are having problems. It's also a good idea to know your test results and keep a list of the medicines you take. Where can you learn more? Go to https://chpepiceweb.IQcard. org and sign in to your [employer] account. Enter G551 in the [facility] box to learn more about \"Learning About Vision Tests. \"     If you do not have an account, please click on the \"Sign Up Now\" link. Current as of: December 3, 2017  Content Version: 11.6  © 6886-7283 [employer]. Care instructions adapted under license by [facility] ([facility]). If you have questions about a medical condition or this instruction, always ask your healthcare professional. [address] any warranty or liability for your use of this information. Patient Education        Reduced Vision: Care Instructions  Your Care Instructions    Reduced vision can be caused by many things. These include macular degeneration and glaucoma. When you can't see as well, daily life can be more challenging. But you can do some things to stay independent and keep doing the activities you enjoy. Follow-up care is a key part of your treatment and safety. Be sure to make and go to all appointments, and call your doctor if you are having problems. It's also a good idea to know your test results and keep a list of the medicines you take. How can you care for yourself at home? Use lighting  Point lighting at what you want to see. Don't point it at your eyes. Add lamps where you need extra lighting. Use curtains or shades to adjust how much natural light there is. Use good lighting in places where you could easily fall. These include entries and stairways.   Use labels  Label things that are hard to recognize or that could be

## 2018-08-01 NOTE — PROGRESS NOTES
(supraventricular tachycardia) ([facility] [address]      Past Surgical History:   Procedure Laterality Date    APPENDECTOMY      CARDIAC CATHETERIZATION  10/3/11    CARDIAC CATHETERIZATION  9/1/1987    Normal left ventricular function and hemodynamics , Normal coronary arteriograms    CHOLECYSTECTOMY      CORONARY ANGIOPLASTY WITH STENT PLACEMENT      DIAGNOSTIC CARDIAC CATH LAB PROCEDURE  3/24/10    EF over 60%  Normal left ventricular function and hemodynamics, Diffuse nonocclusive CAD , w/ a patent stent in the circumflex marginal branch    DIAGNOSTIC CARDIAC CATH LAB PROCEDURE  10/28/08    EF 60% Normal LV function and hemodynamics, Diffuse non-occlusive CAD     DIAGNOSTIC CARDIAC CATH LAB PROCEDURE  5/8/07    EF over 60% Normal LV chamber size and wall motion, Diffuse nonocclusive CAD     DIAGNOSTIC CARDIAC CATH LAB PROCEDURE  4/11/06    EF over 60%  Normal LV function and hemodynamics, Severe two- vessel CAD , Successful percutaneous coronary intevention w/cuttng balloon angioplasty to a small second circumflex marginal branch and primary stent placement using a drug eluting stent to the diagonal branch of the LAD     DIAGNOSTIC CARDIAC CATH LAB PROCEDURE  9/9/03    EF over 60% Normal LV function and hemodynamics Mild diffuse nonocclusive CAD w/o hemodynamically significant lesions identified        Family History   Problem Relation Age of Onset    Hypertension Mother     Cancer Father     Diabetes Brother        CareTeam (Including outside providers/suppliers regularly involved in providing care):   Patient Care Team:  Ashanti Pérez DO as PCP - General  RICARDO Salazar MD (Cardiology)    Wt Readings from Last 3 Encounters:   08/01/18 230 lb (104.3 kg)   06/06/18 225 lb (102.1 kg)   09/27/17 230 lb (104.3 kg)     Vitals:    08/01/18 0722   BP: 136/72   Site: Left Arm   Position: Sitting   Cuff Size: Large Adult   Pulse: 54   Temp: 97.7 °F (36.5 °C)   TempSrc: Temporal   SpO2: 97%   Weight: 230 lb (104.3 kg)   Height: 6' (1.829 m)       Physical exam is documented elsewhere in a separate note. Patient's complete Health Risk Assessment and screening values have been reviewed and are found in Flowsheets. The following problems were reviewed today and where indicated follow up appointments were made and/or referrals ordered. Positive Risk Factor Screenings with Interventions:     General Health:  General  In general, how would you say your health is?: Fair  In the past 7 days, have you experienced any of the following?: None of These  Do you get the social and emotional support that you need?: Yes  Do you have a Living Will?: (!) No  General Health Risk Interventions:  · No Living Will: additional information provided    Health Habits/Nutrition:  Health Habits/Nutrition  Do you exercise for at least 20 minutes 2-3 times per week?: (!) No  Have you lost any weight without trying in the past 3 months?: No  Do you eat fewer than 2 meals per day?: No  Have you seen a dentist within the past year?: Yes ([facility])  Body mass index is 31.19 kg/m². Health Habits/Nutrition Interventions:  · Inadequate physical activity:  educational materials provided to promote increased physical activity    Hearing/Vision:  Hearing/Vision  Do you or your family notice any trouble with your hearing?: (!) Yes  Do you have difficulty driving, watching TV, or doing any of your daily activities because of your eyesight?: (!) Yes  Have you had an eye exam within the past year?: Yes  Hearing/Vision Interventions:  · Hearing concerns:   Additional information was provided  · Vision concerns:  patient encouraged to make appointment with his/her eye specialist    Personalized Preventive Plan   Current Health Maintenance Status  Immunization History   Administered Date(s) Administered    Influenza Vaccine, unspecified formulation 11/01/2015    Pneumococcal 13-valent Conjugate (Iyntion10) 10/12/2016    Pneumococcal Polysaccharide

## 2018-08-03 DIAGNOSIS — Z95.5 S/P CORONARY ARTERY STENT PLACEMENT: ICD-10-CM

## 2018-08-03 DIAGNOSIS — F41.9 ANXIETY: ICD-10-CM

## 2018-08-03 DIAGNOSIS — I47.1 SVT (SUPRAVENTRICULAR TACHYCARDIA) (HCC): Primary | ICD-10-CM

## 2018-08-03 DIAGNOSIS — F32.A DEPRESSION, UNSPECIFIED DEPRESSION TYPE: ICD-10-CM

## 2018-08-03 NOTE — TELEPHONE ENCOUNTER
We can always do Coumadin  We'll start with 5 mg daily  Dispense #30 with 2 refills  Recommend check PT INR in 3 days.   Will likely require frequent checking for the next couple of weeks until we get him dialed in on the appropriate dose

## 2018-08-08 DIAGNOSIS — N40.1 BENIGN NON-NODULAR PROSTATIC HYPERPLASIA WITH LOWER URINARY TRACT SYMPTOMS: ICD-10-CM

## 2018-08-08 DIAGNOSIS — I10 ESSENTIAL HYPERTENSION: ICD-10-CM

## 2018-08-08 RX ORDER — CITALOPRAM 40 MG/1
40 TABLET ORAL DAILY
Qty: 90 TABLET | Refills: 3 | Status: SHIPPED | OUTPATIENT
Start: 2018-08-08 | End: 2019-07-26 | Stop reason: SDUPTHER

## 2018-08-08 RX ORDER — FINASTERIDE 5 MG/1
TABLET, FILM COATED ORAL
Qty: 90 TABLET | Refills: 3 | Status: SHIPPED | OUTPATIENT
Start: 2018-08-08 | End: 2019-07-26 | Stop reason: SDUPTHER

## 2018-08-08 RX ORDER — LISINOPRIL 10 MG/1
TABLET ORAL
Qty: 90 TABLET | Refills: 3 | Status: SHIPPED | OUTPATIENT
Start: 2018-08-08 | End: 2019-08-03 | Stop reason: SDUPTHER

## 2018-08-08 RX ORDER — ATORVASTATIN CALCIUM 20 MG/1
TABLET, FILM COATED ORAL
Qty: 30 TABLET | Refills: 11 | Status: SHIPPED | OUTPATIENT
Start: 2018-08-08 | End: 2019-07-26 | Stop reason: SDUPTHER

## 2018-08-09 RX ORDER — WARFARIN SODIUM 5 MG/1
5 TABLET ORAL DAILY
Qty: 30 TABLET | Refills: 2 | Status: SHIPPED | OUTPATIENT
Start: 2018-08-09 | End: 2018-09-18 | Stop reason: SDUPTHER

## 2018-08-13 DIAGNOSIS — Z95.5 S/P CORONARY ARTERY STENT PLACEMENT: ICD-10-CM

## 2018-08-13 DIAGNOSIS — I47.1 SVT (SUPRAVENTRICULAR TACHYCARDIA) (HCC): ICD-10-CM

## 2018-08-13 DIAGNOSIS — Z00.00 VISIT FOR PREVENTIVE HEALTH EXAMINATION: ICD-10-CM

## 2018-08-13 DIAGNOSIS — I10 ESSENTIAL HYPERTENSION: ICD-10-CM

## 2018-08-13 DIAGNOSIS — E78.2 MIXED HYPERLIPIDEMIA: ICD-10-CM

## 2018-08-13 LAB
ALBUMIN SERPL-MCNC: 4.2 G/DL (ref 3.5–5.2)
ALP BLD-CCNC: 88 U/L (ref 40–130)
ALT SERPL-CCNC: 14 U/L (ref 5–41)
ANION GAP SERPL CALCULATED.3IONS-SCNC: 18 MMOL/L (ref 7–19)
AST SERPL-CCNC: 15 U/L (ref 5–40)
BASOPHILS ABSOLUTE: 0 K/UL (ref 0–0.2)
BASOPHILS RELATIVE PERCENT: 0.3 % (ref 0–1)
BILIRUB SERPL-MCNC: 0.4 MG/DL (ref 0.2–1.2)
BUN BLDV-MCNC: 27 MG/DL (ref 8–23)
CALCIUM SERPL-MCNC: 9.3 MG/DL (ref 8.8–10.2)
CHLORIDE BLD-SCNC: 101 MMOL/L (ref 98–111)
CHOLESTEROL, TOTAL: 146 MG/DL (ref 160–199)
CO2: 23 MMOL/L (ref 22–29)
CREAT SERPL-MCNC: 0.8 MG/DL (ref 0.5–1.2)
EOSINOPHILS ABSOLUTE: 0.2 K/UL (ref 0–0.6)
EOSINOPHILS RELATIVE PERCENT: 2.7 % (ref 0–5)
GFR NON-AFRICAN AMERICAN: >60
GLUCOSE BLD-MCNC: 110 MG/DL (ref 74–109)
HBA1C MFR BLD: 5.6 % (ref 4–6)
HCT VFR BLD CALC: 47.9 % (ref 42–52)
HDLC SERPL-MCNC: 49 MG/DL (ref 55–121)
HEMOGLOBIN: 15.3 G/DL (ref 14–18)
INR BLD: 1.13 (ref 0.88–1.18)
LDL CHOLESTEROL CALCULATED: 72 MG/DL
LYMPHOCYTES ABSOLUTE: 1.4 K/UL (ref 1.1–4.5)
LYMPHOCYTES RELATIVE PERCENT: 23.3 % (ref 20–40)
MCH RBC QN AUTO: 29.7 PG (ref 27–31)
MCHC RBC AUTO-ENTMCNC: 31.9 G/DL (ref 33–37)
MCV RBC AUTO: 92.8 FL (ref 80–94)
MONOCYTES ABSOLUTE: 0.8 K/UL (ref 0–0.9)
MONOCYTES RELATIVE PERCENT: 12.5 % (ref 0–10)
NEUTROPHILS ABSOLUTE: 3.7 K/UL (ref 1.5–7.5)
NEUTROPHILS RELATIVE PERCENT: 60.9 % (ref 50–65)
PDW BLD-RTO: 13.4 % (ref 11.5–14.5)
PLATELET # BLD: 229 K/UL (ref 130–400)
PMV BLD AUTO: 9.4 FL (ref 9.4–12.4)
POTASSIUM SERPL-SCNC: 4.2 MMOL/L (ref 3.5–5)
PROTHROMBIN TIME: 14.4 SEC (ref 12–14.6)
RBC # BLD: 5.16 M/UL (ref 4.7–6.1)
SODIUM BLD-SCNC: 142 MMOL/L (ref 136–145)
T4 FREE: 1 NG/DL (ref 0.9–1.7)
TOTAL PROTEIN: 6.9 G/DL (ref 6.6–8.7)
TRIGL SERPL-MCNC: 124 MG/DL (ref 0–149)
TSH SERPL DL<=0.05 MIU/L-ACNC: 5.29 UIU/ML (ref 0.27–4.2)
WBC # BLD: 6 K/UL (ref 4.8–10.8)

## 2018-08-14 ENCOUNTER — TELEPHONE (OUTPATIENT)
Dept: PRIMARY CARE CLINIC | Age: 78
End: 2018-08-14

## 2018-08-14 RX ORDER — LEVOTHYROXINE SODIUM 0.03 MG/1
25 TABLET ORAL DAILY
Qty: 30 TABLET | Refills: 3 | Status: SHIPPED | OUTPATIENT
Start: 2018-08-14 | End: 2018-12-03 | Stop reason: SDUPTHER

## 2018-08-22 ENCOUNTER — ANTI-COAG VISIT (OUTPATIENT)
Dept: PRIMARY CARE CLINIC | Age: 78
End: 2018-08-22

## 2018-08-22 DIAGNOSIS — Z79.01 ANTICOAGULANT LONG-TERM USE: Primary | ICD-10-CM

## 2018-08-22 DIAGNOSIS — I47.1 SVT (SUPRAVENTRICULAR TACHYCARDIA) (HCC): ICD-10-CM

## 2018-08-22 DIAGNOSIS — Z95.5 S/P CORONARY ARTERY STENT PLACEMENT: ICD-10-CM

## 2018-08-22 LAB
INR BLD: 2.17 (ref 0.88–1.18)
PROTHROMBIN TIME: 24.2 SEC (ref 12–14.6)

## 2018-08-22 NOTE — PROGRESS NOTES
Erick Walker, [facility] [place] [place] Clinical Staff             Please call patient and let them know results.    Continue current Coumadin dosage and repeat pro time in 2 weeks      Patient's spouse voiced understanding  No questions at this time  They do not need any thing sent to the pharmacy

## 2018-09-12 RX ORDER — OMEPRAZOLE 40 MG/1
40 CAPSULE, DELAYED RELEASE ORAL DAILY
Qty: 90 CAPSULE | Refills: 3 | Status: SHIPPED | OUTPATIENT
Start: 2018-09-12 | End: 2019-01-01 | Stop reason: SDUPTHER

## 2018-09-18 RX ORDER — WARFARIN SODIUM 5 MG/1
7.5 TABLET ORAL DAILY
Qty: 45 TABLET | Refills: 2 | Status: SHIPPED | OUTPATIENT
Start: 2018-09-18 | End: 2019-02-04 | Stop reason: SDUPTHER

## 2018-09-25 DIAGNOSIS — I47.1 SVT (SUPRAVENTRICULAR TACHYCARDIA) (HCC): ICD-10-CM

## 2018-09-25 DIAGNOSIS — Z95.5 S/P CORONARY ARTERY STENT PLACEMENT: ICD-10-CM

## 2018-09-25 LAB
INR BLD: 2.66 (ref 0.88–1.18)
PROTHROMBIN TIME: 28.5 SEC (ref 12–14.6)

## 2018-09-26 ENCOUNTER — ANTI-COAG VISIT (OUTPATIENT)
Dept: PRIMARY CARE CLINIC | Age: 78
End: 2018-09-26

## 2018-11-09 RX ORDER — WARFARIN SODIUM 5 MG/1
TABLET ORAL
Qty: 30 TABLET | Refills: 2 | Status: SHIPPED | OUTPATIENT
Start: 2018-11-09 | End: 2019-07-13 | Stop reason: SDUPTHER

## 2018-12-03 RX ORDER — LEVOTHYROXINE SODIUM 0.03 MG/1
25 TABLET ORAL DAILY
Qty: 30 TABLET | Refills: 5 | Status: SHIPPED | OUTPATIENT
Start: 2018-12-03 | End: 2019-05-27 | Stop reason: SDUPTHER

## 2018-12-06 ENCOUNTER — ANTI-COAG VISIT (OUTPATIENT)
Dept: PRIMARY CARE CLINIC | Age: 78
End: 2018-12-06

## 2018-12-06 DIAGNOSIS — I47.1 SVT (SUPRAVENTRICULAR TACHYCARDIA) (HCC): ICD-10-CM

## 2018-12-06 DIAGNOSIS — Z95.5 S/P CORONARY ARTERY STENT PLACEMENT: ICD-10-CM

## 2018-12-06 LAB
INR BLD: 2.46 (ref 0.88–1.18)
PROTHROMBIN TIME: 26.9 SEC (ref 12–14.6)

## 2019-01-01 ENCOUNTER — TELEPHONE (OUTPATIENT)
Dept: PRIMARY CARE CLINIC | Age: 79
End: 2019-01-01

## 2019-01-01 ENCOUNTER — ANTI-COAG VISIT (OUTPATIENT)
Dept: PRIMARY CARE CLINIC | Age: 79
End: 2019-01-01

## 2019-01-01 DIAGNOSIS — I10 ESSENTIAL HYPERTENSION: ICD-10-CM

## 2019-01-01 DIAGNOSIS — F32.A DEPRESSION, UNSPECIFIED DEPRESSION TYPE: ICD-10-CM

## 2019-01-01 DIAGNOSIS — F41.9 ANXIETY: ICD-10-CM

## 2019-01-01 LAB
INR BLD: 2.39 (ref 0.88–1.18)
PROTHROMBIN TIME: 25.3 SEC (ref 12–14.6)

## 2019-01-01 RX ORDER — ANTIOX #8/OM3/DHA/EPA/LUT/ZEAX 250-2.5 MG
1 CAPSULE ORAL DAILY
Qty: 30 CAPSULE | Refills: 5 | Status: SHIPPED | OUTPATIENT
Start: 2019-01-01 | End: 2020-01-01

## 2019-01-01 RX ORDER — CITALOPRAM 40 MG/1
40 TABLET ORAL DAILY
Qty: 30 TABLET | Refills: 11 | Status: SHIPPED | OUTPATIENT
Start: 2019-01-01

## 2019-01-01 RX ORDER — OMEPRAZOLE 40 MG/1
CAPSULE, DELAYED RELEASE ORAL
Qty: 90 CAPSULE | Refills: 3 | Status: SHIPPED | OUTPATIENT
Start: 2019-01-01

## 2019-01-01 RX ORDER — ATORVASTATIN CALCIUM 20 MG/1
20 TABLET, FILM COATED ORAL NIGHTLY
Qty: 30 TABLET | Refills: 11 | Status: ON HOLD
Start: 2019-01-01 | End: 2020-01-01 | Stop reason: HOSPADM

## 2019-01-01 RX ORDER — WARFARIN SODIUM 5 MG/1
TABLET ORAL
Qty: 45 TABLET | Refills: 2 | Status: SHIPPED | OUTPATIENT
Start: 2019-01-01 | End: 2020-01-01

## 2019-01-01 RX ORDER — METOPROLOL SUCCINATE 50 MG/1
25 TABLET, EXTENDED RELEASE ORAL DAILY
Qty: 30 TABLET | Refills: 11 | Status: SHIPPED
Start: 2019-01-01 | End: 2020-01-01 | Stop reason: SINTOL

## 2019-01-01 RX ORDER — LISINOPRIL 10 MG/1
10 TABLET ORAL DAILY
Qty: 90 TABLET | Refills: 3 | Status: SHIPPED | OUTPATIENT
Start: 2019-01-01

## 2019-01-07 ENCOUNTER — TELEPHONE (OUTPATIENT)
Dept: PRIMARY CARE CLINIC | Age: 79
End: 2019-01-07

## 2019-01-07 DIAGNOSIS — Z95.5 S/P CORONARY ARTERY STENT PLACEMENT: ICD-10-CM

## 2019-01-07 DIAGNOSIS — I47.1 SVT (SUPRAVENTRICULAR TACHYCARDIA) (HCC): ICD-10-CM

## 2019-01-07 LAB
INR BLD: 2.76 (ref 0.88–1.18)
PROTHROMBIN TIME: 28.4 SEC (ref 12–14.6)

## 2019-01-17 RX ORDER — METOPROLOL SUCCINATE 50 MG/1
25 TABLET, EXTENDED RELEASE ORAL DAILY
Qty: 30 TABLET | Refills: 5 | Status: SHIPPED | OUTPATIENT
Start: 2019-01-17 | End: 2019-01-01 | Stop reason: SDUPTHER

## 2019-01-22 ENCOUNTER — OFFICE VISIT (OUTPATIENT)
Dept: CARDIOLOGY | Age: 79
End: 2019-01-22
Payer: MEDICARE

## 2019-01-22 VITALS
WEIGHT: 234 LBS | DIASTOLIC BLOOD PRESSURE: 70 MMHG | HEIGHT: 72 IN | SYSTOLIC BLOOD PRESSURE: 96 MMHG | BODY MASS INDEX: 31.69 KG/M2 | HEART RATE: 58 BPM

## 2019-01-22 DIAGNOSIS — I48.19 PERSISTENT ATRIAL FIBRILLATION (HCC): Primary | ICD-10-CM

## 2019-01-22 DIAGNOSIS — I05.9 MITRAL VALVE DISORDER: ICD-10-CM

## 2019-01-22 DIAGNOSIS — I10 ESSENTIAL HYPERTENSION: ICD-10-CM

## 2019-01-22 DIAGNOSIS — I25.10 CORONARY ARTERY DISEASE INVOLVING NATIVE CORONARY ARTERY OF NATIVE HEART WITHOUT ANGINA PECTORIS: ICD-10-CM

## 2019-01-22 LAB
INTERNATIONAL NORMALIZATION RATIO, POC: 2.3
PROTHROMBIN TIME, POC: NORMAL

## 2019-01-22 PROCEDURE — 1101F PT FALLS ASSESS-DOCD LE1/YR: CPT | Performed by: CLINICAL NURSE SPECIALIST

## 2019-01-22 PROCEDURE — 85610 PROTHROMBIN TIME: CPT | Performed by: CLINICAL NURSE SPECIALIST

## 2019-01-22 PROCEDURE — 93000 ELECTROCARDIOGRAM COMPLETE: CPT | Performed by: CLINICAL NURSE SPECIALIST

## 2019-01-22 PROCEDURE — 4040F PNEUMOC VAC/ADMIN/RCVD: CPT | Performed by: CLINICAL NURSE SPECIALIST

## 2019-01-22 PROCEDURE — 99214 OFFICE O/P EST MOD 30 MIN: CPT | Performed by: CLINICAL NURSE SPECIALIST

## 2019-01-22 PROCEDURE — G8427 DOCREV CUR MEDS BY ELIG CLIN: HCPCS | Performed by: CLINICAL NURSE SPECIALIST

## 2019-01-22 PROCEDURE — G8417 CALC BMI ABV UP PARAM F/U: HCPCS | Performed by: CLINICAL NURSE SPECIALIST

## 2019-01-22 PROCEDURE — G8598 ASA/ANTIPLAT THER USED: HCPCS | Performed by: CLINICAL NURSE SPECIALIST

## 2019-01-22 PROCEDURE — 1036F TOBACCO NON-USER: CPT | Performed by: CLINICAL NURSE SPECIALIST

## 2019-01-22 PROCEDURE — 1123F ACP DISCUSS/DSCN MKR DOCD: CPT | Performed by: CLINICAL NURSE SPECIALIST

## 2019-01-22 PROCEDURE — G8484 FLU IMMUNIZE NO ADMIN: HCPCS | Performed by: CLINICAL NURSE SPECIALIST

## 2019-01-28 ENCOUNTER — HOSPITAL ENCOUNTER (OUTPATIENT)
Dept: NON INVASIVE DIAGNOSTICS | Age: 79
Discharge: HOME | End: 2019-01-28
Payer: MEDICARE

## 2019-01-28 DIAGNOSIS — I48.19 PERSISTENT ATRIAL FIBRILLATION (HCC): ICD-10-CM

## 2019-01-28 DIAGNOSIS — I05.9 MITRAL VALVE DISORDER: ICD-10-CM

## 2019-01-28 LAB
LV EF: 45 %
LVEF MODALITY: NORMAL

## 2019-01-28 PROCEDURE — 93306 TTE W/DOPPLER COMPLETE: CPT

## 2019-02-04 ENCOUNTER — TELEPHONE (OUTPATIENT)
Dept: CARDIOLOGY | Age: 79
End: 2019-02-04

## 2019-02-04 RX ORDER — WARFARIN SODIUM 5 MG/1
TABLET ORAL
Qty: 45 TABLET | Refills: 2 | Status: SHIPPED | OUTPATIENT
Start: 2019-02-04 | End: 2020-01-01 | Stop reason: SDUPTHER

## 2019-02-22 RX ORDER — CITALOPRAM 20 MG/1
20 TABLET ORAL DAILY
COMMUNITY

## 2019-02-22 RX ORDER — PANTOPRAZOLE SODIUM 40 MG/1
40 TABLET, DELAYED RELEASE ORAL DAILY
Status: ON HOLD | COMMUNITY
End: 2019-03-25

## 2019-02-22 RX ORDER — ASPIRIN 81 MG/1
81 TABLET ORAL DAILY
COMMUNITY

## 2019-02-22 RX ORDER — LISINOPRIL 20 MG/1
20 TABLET ORAL DAILY
COMMUNITY

## 2019-02-22 RX ORDER — FINASTERIDE 5 MG/1
5 TABLET, FILM COATED ORAL DAILY
COMMUNITY

## 2019-03-03 NOTE — H&P (VIEW-ONLY)
"Chief Complaint   Patient presents with   • Constipation     constipation  also having problems swallowing food       PCP: Kalyan Patino DO  REFER: No ref. provider found    Subjective     HPI    History of constipation for \"many years.\"  He will go \"couple of days,\" however he has gone up to 4-5 days without BM.  He will take something to facilitate BM then will experience large amount of BM.  No BRBPR, no melena.  Minimal abd pain, BM does not improve abdominal pain.      Worsening dysphagia over past year.  He has difficulty swallowing solid foods and ice cream.  Sx felt in upper esophagus.  He coughs until sx is relieved.  No trouble swallowing his \"coke.\"  Heartburn described as stable with use of Prilosec.    CScope (Dr Dillon) 2/2016-fiver polyps removed  CScope (Dr Dillon) 2013-adenomatous tubular polyps    Endoscopy (Dr Dillon) 2014-resolved Camp's (3 yr)    Past Medical History:   Diagnosis Date   • Hypertension        Past Surgical History:   Procedure Laterality Date   • CHOLECYSTECTOMY     • COLONOSCOPY  02/22/2016    5 polyps  all removed   • ENDOSCOPY  05/23/2014    resolved camp's       Outpatient Medications Marked as Taking for the 3/4/19 encounter (Office Visit) with Ramses Gupta APRN   Medication Sig Dispense Refill   • aspirin 81 MG EC tablet Take 81 mg by mouth Daily.     • atorvastatin (LIPITOR) 20 MG tablet Take 20 mg by mouth Daily.     • citalopram (CeleXA) 20 MG tablet Take 20 mg by mouth Daily.     • finasteride (PROSCAR) 5 MG tablet Take 5 mg by mouth Daily.     • levothyroxine (SYNTHROID, LEVOTHROID) 25 MCG tablet Take 25 mcg by mouth Daily.     • lisinopril (PRINIVIL,ZESTRIL) 20 MG tablet Take 20 mg by mouth Daily.     • metoprolol tartrate (LOPRESSOR) 25 MG tablet Take 25 mg by mouth 2 (Two) Times a Day.     • Multiple Vitamins-Minerals (ICAPS AREDS 2 PO) Take  by mouth.     • omeprazole (priLOSEC) 40 MG capsule Take 40 mg by mouth Daily.     • Sennosides-Docusate " Sodium (STOOL SOFTENER/LAXATIVE PO) Take  by mouth.     • warfarin (COUMADIN) 5 MG tablet Take 5 mg by mouth Daily.         Allergies   Allergen Reactions   • Penicillins Other (See Comments)     unsure       Social History     Socioeconomic History   • Marital status:      Spouse name: Not on file   • Number of children: Not on file   • Years of education: Not on file   • Highest education level: Not on file   Social Needs   • Financial resource strain: Not on file   • Food insecurity - worry: Not on file   • Food insecurity - inability: Not on file   • Transportation needs - medical: Not on file   • Transportation needs - non-medical: Not on file   Occupational History   • Not on file   Tobacco Use   • Smoking status: Former Smoker   • Smokeless tobacco: Former User   Substance and Sexual Activity   • Alcohol use: No     Frequency: Never   • Drug use: No   • Sexual activity: Not on file   Other Topics Concern   • Not on file   Social History Narrative   • Not on file       Family History   Problem Relation Age of Onset   • Colon cancer Neg Hx    • Esophageal cancer Neg Hx        Review of Systems   Constitutional: Negative for fatigue, fever and unexpected weight change.   HENT: Positive for trouble swallowing. Negative for hearing loss, sore throat and voice change.    Eyes: Negative for visual disturbance.   Respiratory: Negative for cough, shortness of breath and wheezing.    Cardiovascular: Negative for chest pain and palpitations.   Gastrointestinal: Positive for abdominal pain, constipation and diarrhea. Negative for blood in stool and vomiting.   Endocrine: Negative for polydipsia and polyuria.   Genitourinary: Negative for difficulty urinating, dysuria, hematuria and urgency.   Musculoskeletal: Negative for joint swelling and myalgias.   Skin: Negative for color change, rash and wound.   Neurological: Negative for dizziness, tremors, seizures and syncope.   Hematological: Does not bruise/bleed  "easily.   Psychiatric/Behavioral: Negative for agitation and confusion. The patient is not nervous/anxious.        Objective     Vitals:    03/04/19 0914   BP: 126/76   Pulse: 70   Temp: 98 °F (36.7 °C)   SpO2: 99%   Weight: 104 kg (230 lb)   Height: 182.9 cm (72\")     Body mass index is 31.19 kg/m².    Physical Exam   Constitutional: He is oriented to person, place, and time. He appears well-developed and well-nourished. He is cooperative.   HENT:   Head: Normocephalic and atraumatic.   Eyes: Conjunctivae are normal. Pupils are equal, round, and reactive to light. No scleral icterus.   Neck: Normal range of motion. Neck supple. No JVD present. No thyroid mass and no thyromegaly present.   Cardiovascular: Normal rate, regular rhythm and normal heart sounds. Exam reveals no gallop and no friction rub.   No murmur heard.  Pulmonary/Chest: Effort normal and breath sounds normal. No accessory muscle usage. No respiratory distress. He has no wheezes. He has no rales.   Abdominal: Soft. Normal appearance and bowel sounds are normal. He exhibits no distension, no ascites and no mass. There is no hepatosplenomegaly. There is no tenderness. There is no rebound and no guarding.   Musculoskeletal: Normal range of motion. He exhibits no edema or tenderness.     Vascular Status -  His right foot exhibits normal foot vasculature  and no edema. His left foot exhibits normal foot vasculature  and no edema.  Lymphadenopathy:     He has no cervical adenopathy.   Neurological: He is alert and oriented to person, place, and time. He has normal strength. Gait normal.   Skin: Skin is warm, dry and intact. No rash noted.       Imaging Results (most recent)     None          Body mass index is 31.19 kg/m².    Assessment/Plan     Samir was seen today for constipation.    Diagnoses and all orders for this visit:    Altered bowel function  -     Case Request; Standing  -     Implement Anesthesia Orders Day of Procedure; Standing  -     Obtain " Informed Consent; Standing  -     Case Request    Constipation, unspecified constipation type    Dysphagia, unspecified type  -     Case Request; Standing  -     Implement Anesthesia Orders Day of Procedure; Standing  -     Obtain Informed Consent; Standing  -     Case Request    Anticoagulated        COLONOSCOPY WITH ANESTHESIA (N/A)   2. ENDOSCOPY WITH ANESTHESIA    Coumadin taken today  Avoid constipation, miralax daily, adjust as needed  Increase water consumption  Discussed trial of miralax vs proceeding with cscope, pt elected to proceed with cscope    The risk of the endoscopy were discussed in detail.  We discussed the risk of perforation (one out of 8798-9030, riskier with dilation), bleeding (one out of 500), and the rare risks of infection, adverse reaction to anesthesia, respiratory failure, cardiac failure including MI and adverse reaction to medications, etc.  We discussed consequences that could occur if a risk were to develop such as the need for hospitalization, blood transfusion, surgical intervention, medications, pain and disability and death.  Alternatives include not doing anything, or pursuing an UGI series which only offers a diagnosis with potential less accuracy compared to egd.  The patient verbalizes understanding and agrees to proceed.      All risks, benefits, alternatives, and indications of colonoscopy procedure have been discussed with the patient. Risks to include perforation of the colon requiring possible surgery or colostomy, risk of bleeding from biopsies or removal of colon tissue, possibility of missing a colon polyp or cancer, or adverse drug reaction.  Benefits to include the diagnosis and management of disease of the colon and rectum. Alternatives to include barium enema, radiographic evaluation, lab testing or no intervention. Pt verbalizes understanding and agrees to proceed with procedure.    Patient is to hold their anticoagulation medication per the direction of their  prescribing physician, Dr Patino. This is to prevent any risk or complication from bleeding intra and post procedure. If they develop bleeding post procedure they are to go the emergency department for further evaluation and treatment immediately      Patient's Body mass index is 31.19 kg/m². BMI is above normal parameters. Recommendations include: no follow up.      There are no Patient Instructions on file for this visit.

## 2019-03-03 NOTE — PROGRESS NOTES
"Chief Complaint   Patient presents with   • Constipation     constipation  also having problems swallowing food       PCP: Kalyan Patino DO  REFER: No ref. provider found    Subjective     HPI    History of constipation for \"many years.\"  He will go \"couple of days,\" however he has gone up to 4-5 days without BM.  He will take something to facilitate BM then will experience large amount of BM.  No BRBPR, no melena.  Minimal abd pain, BM does not improve abdominal pain.      Worsening dysphagia over past year.  He has difficulty swallowing solid foods and ice cream.  Sx felt in upper esophagus.  He coughs until sx is relieved.  No trouble swallowing his \"coke.\"  Heartburn described as stable with use of Prilosec.    CScope (Dr Dillon) 2/2016-fiver polyps removed  CScope (Dr Dillon) 2013-adenomatous tubular polyps    Endoscopy (Dr Dillon) 2014-resolved Camp's (3 yr)    Past Medical History:   Diagnosis Date   • Hypertension        Past Surgical History:   Procedure Laterality Date   • CHOLECYSTECTOMY     • COLONOSCOPY  02/22/2016    5 polyps  all removed   • ENDOSCOPY  05/23/2014    resolved camp's       Outpatient Medications Marked as Taking for the 3/4/19 encounter (Office Visit) with Ramses Gupta APRN   Medication Sig Dispense Refill   • aspirin 81 MG EC tablet Take 81 mg by mouth Daily.     • atorvastatin (LIPITOR) 20 MG tablet Take 20 mg by mouth Daily.     • citalopram (CeleXA) 20 MG tablet Take 20 mg by mouth Daily.     • finasteride (PROSCAR) 5 MG tablet Take 5 mg by mouth Daily.     • levothyroxine (SYNTHROID, LEVOTHROID) 25 MCG tablet Take 25 mcg by mouth Daily.     • lisinopril (PRINIVIL,ZESTRIL) 20 MG tablet Take 20 mg by mouth Daily.     • metoprolol tartrate (LOPRESSOR) 25 MG tablet Take 25 mg by mouth 2 (Two) Times a Day.     • Multiple Vitamins-Minerals (ICAPS AREDS 2 PO) Take  by mouth.     • omeprazole (priLOSEC) 40 MG capsule Take 40 mg by mouth Daily.     • Sennosides-Docusate " Sodium (STOOL SOFTENER/LAXATIVE PO) Take  by mouth.     • warfarin (COUMADIN) 5 MG tablet Take 5 mg by mouth Daily.         Allergies   Allergen Reactions   • Penicillins Other (See Comments)     unsure       Social History     Socioeconomic History   • Marital status:      Spouse name: Not on file   • Number of children: Not on file   • Years of education: Not on file   • Highest education level: Not on file   Social Needs   • Financial resource strain: Not on file   • Food insecurity - worry: Not on file   • Food insecurity - inability: Not on file   • Transportation needs - medical: Not on file   • Transportation needs - non-medical: Not on file   Occupational History   • Not on file   Tobacco Use   • Smoking status: Former Smoker   • Smokeless tobacco: Former User   Substance and Sexual Activity   • Alcohol use: No     Frequency: Never   • Drug use: No   • Sexual activity: Not on file   Other Topics Concern   • Not on file   Social History Narrative   • Not on file       Family History   Problem Relation Age of Onset   • Colon cancer Neg Hx    • Esophageal cancer Neg Hx        Review of Systems   Constitutional: Negative for fatigue, fever and unexpected weight change.   HENT: Positive for trouble swallowing. Negative for hearing loss, sore throat and voice change.    Eyes: Negative for visual disturbance.   Respiratory: Negative for cough, shortness of breath and wheezing.    Cardiovascular: Negative for chest pain and palpitations.   Gastrointestinal: Positive for abdominal pain, constipation and diarrhea. Negative for blood in stool and vomiting.   Endocrine: Negative for polydipsia and polyuria.   Genitourinary: Negative for difficulty urinating, dysuria, hematuria and urgency.   Musculoskeletal: Negative for joint swelling and myalgias.   Skin: Negative for color change, rash and wound.   Neurological: Negative for dizziness, tremors, seizures and syncope.   Hematological: Does not bruise/bleed  "easily.   Psychiatric/Behavioral: Negative for agitation and confusion. The patient is not nervous/anxious.        Objective     Vitals:    03/04/19 0914   BP: 126/76   Pulse: 70   Temp: 98 °F (36.7 °C)   SpO2: 99%   Weight: 104 kg (230 lb)   Height: 182.9 cm (72\")     Body mass index is 31.19 kg/m².    Physical Exam   Constitutional: He is oriented to person, place, and time. He appears well-developed and well-nourished. He is cooperative.   HENT:   Head: Normocephalic and atraumatic.   Eyes: Conjunctivae are normal. Pupils are equal, round, and reactive to light. No scleral icterus.   Neck: Normal range of motion. Neck supple. No JVD present. No thyroid mass and no thyromegaly present.   Cardiovascular: Normal rate, regular rhythm and normal heart sounds. Exam reveals no gallop and no friction rub.   No murmur heard.  Pulmonary/Chest: Effort normal and breath sounds normal. No accessory muscle usage. No respiratory distress. He has no wheezes. He has no rales.   Abdominal: Soft. Normal appearance and bowel sounds are normal. He exhibits no distension, no ascites and no mass. There is no hepatosplenomegaly. There is no tenderness. There is no rebound and no guarding.   Musculoskeletal: Normal range of motion. He exhibits no edema or tenderness.     Vascular Status -  His right foot exhibits normal foot vasculature  and no edema. His left foot exhibits normal foot vasculature  and no edema.  Lymphadenopathy:     He has no cervical adenopathy.   Neurological: He is alert and oriented to person, place, and time. He has normal strength. Gait normal.   Skin: Skin is warm, dry and intact. No rash noted.       Imaging Results (most recent)     None          Body mass index is 31.19 kg/m².    Assessment/Plan     Samir was seen today for constipation.    Diagnoses and all orders for this visit:    Altered bowel function  -     Case Request; Standing  -     Implement Anesthesia Orders Day of Procedure; Standing  -     Obtain " Informed Consent; Standing  -     Case Request    Constipation, unspecified constipation type    Dysphagia, unspecified type  -     Case Request; Standing  -     Implement Anesthesia Orders Day of Procedure; Standing  -     Obtain Informed Consent; Standing  -     Case Request    Anticoagulated        COLONOSCOPY WITH ANESTHESIA (N/A)   2. ENDOSCOPY WITH ANESTHESIA    Coumadin taken today  Avoid constipation, miralax daily, adjust as needed  Increase water consumption  Discussed trial of miralax vs proceeding with cscope, pt elected to proceed with cscope    The risk of the endoscopy were discussed in detail.  We discussed the risk of perforation (one out of 9143-9929, riskier with dilation), bleeding (one out of 500), and the rare risks of infection, adverse reaction to anesthesia, respiratory failure, cardiac failure including MI and adverse reaction to medications, etc.  We discussed consequences that could occur if a risk were to develop such as the need for hospitalization, blood transfusion, surgical intervention, medications, pain and disability and death.  Alternatives include not doing anything, or pursuing an UGI series which only offers a diagnosis with potential less accuracy compared to egd.  The patient verbalizes understanding and agrees to proceed.      All risks, benefits, alternatives, and indications of colonoscopy procedure have been discussed with the patient. Risks to include perforation of the colon requiring possible surgery or colostomy, risk of bleeding from biopsies or removal of colon tissue, possibility of missing a colon polyp or cancer, or adverse drug reaction.  Benefits to include the diagnosis and management of disease of the colon and rectum. Alternatives to include barium enema, radiographic evaluation, lab testing or no intervention. Pt verbalizes understanding and agrees to proceed with procedure.    Patient is to hold their anticoagulation medication per the direction of their  prescribing physician, Dr Patino. This is to prevent any risk or complication from bleeding intra and post procedure. If they develop bleeding post procedure they are to go the emergency department for further evaluation and treatment immediately      Patient's Body mass index is 31.19 kg/m². BMI is above normal parameters. Recommendations include: no follow up.      There are no Patient Instructions on file for this visit.

## 2019-03-04 ENCOUNTER — OFFICE VISIT (OUTPATIENT)
Dept: GASTROENTEROLOGY | Facility: CLINIC | Age: 79
End: 2019-03-04

## 2019-03-04 VITALS
OXYGEN SATURATION: 99 % | HEIGHT: 72 IN | SYSTOLIC BLOOD PRESSURE: 126 MMHG | DIASTOLIC BLOOD PRESSURE: 76 MMHG | BODY MASS INDEX: 31.15 KG/M2 | WEIGHT: 230 LBS | TEMPERATURE: 98 F | HEART RATE: 70 BPM

## 2019-03-04 DIAGNOSIS — K59.00 CONSTIPATION, UNSPECIFIED CONSTIPATION TYPE: ICD-10-CM

## 2019-03-04 DIAGNOSIS — Z79.01 ANTICOAGULATED: ICD-10-CM

## 2019-03-04 DIAGNOSIS — R19.8 ALTERED BOWEL FUNCTION: Primary | ICD-10-CM

## 2019-03-04 DIAGNOSIS — R13.10 DYSPHAGIA, UNSPECIFIED TYPE: ICD-10-CM

## 2019-03-04 PROCEDURE — 99214 OFFICE O/P EST MOD 30 MIN: CPT | Performed by: NURSE PRACTITIONER

## 2019-03-04 RX ORDER — OMEPRAZOLE 40 MG/1
40 CAPSULE, DELAYED RELEASE ORAL DAILY
COMMUNITY

## 2019-03-04 RX ORDER — WARFARIN SODIUM 5 MG/1
5 TABLET ORAL
COMMUNITY

## 2019-03-04 RX ORDER — ATORVASTATIN CALCIUM 20 MG/1
20 TABLET, FILM COATED ORAL DAILY
COMMUNITY

## 2019-03-04 RX ORDER — LEVOTHYROXINE SODIUM 0.03 MG/1
25 TABLET ORAL DAILY
COMMUNITY

## 2019-03-15 ENCOUNTER — TELEPHONE (OUTPATIENT)
Dept: PRIMARY CARE CLINIC | Age: 79
End: 2019-03-15

## 2019-03-18 ENCOUNTER — TELEPHONE (OUTPATIENT)
Dept: GASTROENTEROLOGY | Facility: CLINIC | Age: 79
End: 2019-03-18

## 2019-03-25 ENCOUNTER — ANESTHESIA (OUTPATIENT)
Dept: GASTROENTEROLOGY | Facility: HOSPITAL | Age: 79
End: 2019-03-25

## 2019-03-25 ENCOUNTER — HOSPITAL ENCOUNTER (OUTPATIENT)
Facility: HOSPITAL | Age: 79
Setting detail: HOSPITAL OUTPATIENT SURGERY
Discharge: HOME | End: 2019-03-25
Attending: INTERNAL MEDICINE | Admitting: INTERNAL MEDICINE

## 2019-03-25 ENCOUNTER — ANESTHESIA EVENT (OUTPATIENT)
Dept: GASTROENTEROLOGY | Facility: HOSPITAL | Age: 79
End: 2019-03-25

## 2019-03-25 VITALS
HEART RATE: 58 BPM | OXYGEN SATURATION: 95 % | TEMPERATURE: 97.1 F | RESPIRATION RATE: 20 BRPM | WEIGHT: 233 LBS | SYSTOLIC BLOOD PRESSURE: 113 MMHG | HEIGHT: 71 IN | DIASTOLIC BLOOD PRESSURE: 75 MMHG | BODY MASS INDEX: 32.62 KG/M2

## 2019-03-25 DIAGNOSIS — R13.10 DYSPHAGIA, UNSPECIFIED TYPE: ICD-10-CM

## 2019-03-25 PROCEDURE — 43239 EGD BIOPSY SINGLE/MULTIPLE: CPT | Performed by: INTERNAL MEDICINE

## 2019-03-25 PROCEDURE — 87081 CULTURE SCREEN ONLY: CPT | Performed by: INTERNAL MEDICINE

## 2019-03-25 PROCEDURE — 25010000002 PROPOFOL 10 MG/ML EMULSION: Performed by: NURSE ANESTHETIST, CERTIFIED REGISTERED

## 2019-03-25 PROCEDURE — S0260 H&P FOR SURGERY: HCPCS | Performed by: INTERNAL MEDICINE

## 2019-03-25 RX ORDER — SODIUM CHLORIDE 9 MG/ML
500 INJECTION, SOLUTION INTRAVENOUS CONTINUOUS PRN
Status: DISCONTINUED | OUTPATIENT
Start: 2019-03-25 | End: 2019-03-25 | Stop reason: HOSPADM

## 2019-03-25 RX ORDER — LIDOCAINE HYDROCHLORIDE 20 MG/ML
INJECTION, SOLUTION INFILTRATION; PERINEURAL AS NEEDED
Status: DISCONTINUED | OUTPATIENT
Start: 2019-03-25 | End: 2019-03-25 | Stop reason: SURG

## 2019-03-25 RX ORDER — SODIUM CHLORIDE 0.9 % (FLUSH) 0.9 %
3 SYRINGE (ML) INJECTION AS NEEDED
Status: DISCONTINUED | OUTPATIENT
Start: 2019-03-25 | End: 2019-03-25 | Stop reason: HOSPADM

## 2019-03-25 RX ORDER — PROPOFOL 10 MG/ML
VIAL (ML) INTRAVENOUS AS NEEDED
Status: DISCONTINUED | OUTPATIENT
Start: 2019-03-25 | End: 2019-03-25 | Stop reason: SURG

## 2019-03-25 RX ADMIN — PROPOFOL 100 MG: 10 INJECTION, EMULSION INTRAVENOUS at 08:22

## 2019-03-25 RX ADMIN — LIDOCAINE HYDROCHLORIDE 100 MG: 20 INJECTION, SOLUTION INFILTRATION; PERINEURAL at 08:22

## 2019-03-25 RX ADMIN — SODIUM CHLORIDE 500 ML: 9 INJECTION, SOLUTION INTRAVENOUS at 07:49

## 2019-03-25 NOTE — ANESTHESIA PREPROCEDURE EVALUATION
Anesthesia Evaluation     Patient summary reviewed   no history of anesthetic complications:  NPO Solid Status: > 8 hours             Airway   Mallampati: II  TM distance: >3 FB  Neck ROM: full  Dental    (+) upper dentures and lower dentures    Pulmonary - negative pulmonary ROS   Cardiovascular   Exercise tolerance: excellent (>7 METS)    (+) hypertension, cardiac stents (2010) dysrhythmias Atrial Fib, hyperlipidemia,       Neuro/Psych- negative ROS  GI/Hepatic/Renal/Endo    (+) obesity,  GERD,  hypothyroidism,     Musculoskeletal     Abdominal    Substance History      OB/GYN          Other                        Anesthesia Plan    ASA 3     MAC     intravenous induction   Anesthetic plan, all risks, benefits, and alternatives have been provided, discussed and informed consent has been obtained with: patient.

## 2019-03-25 NOTE — ANESTHESIA POSTPROCEDURE EVALUATION
Patient: Samir Yee    Procedure Summary     Date:  03/25/19 Room / Location:  [facility] ENDOSCOPY 5 / [facility]    Anesthesia Start:  0821 Anesthesia Stop:  0828    Procedure:  ESOPHAGOGASTRODUODENOSCOPY WITH ANESTHESIA (N/A ) Diagnosis:       Dysphagia, unspecified type      (Dysphagia, unspecified type [R13.10])    Surgeon:  Bayron Dillon DO Provider:  Lawrence Arredondo CRNA    Anesthesia Type:  MAC ASA Status:  3          Anesthesia Type: MAC  Last vitals  BP   107/79 (03/25/19 0840)   Temp   97.1 °F (36.2 °C) (03/25/19 0725)   Pulse   69 (03/25/19 0840)   Resp   20 (03/25/19 0840)     SpO2   94 % (03/25/19 0840)     Post Anesthesia Care and Evaluation    Patient location during evaluation: PHASE II  Level of consciousness: awake and alert  Pain management: adequate  Airway patency: patent  Anesthetic complications: No anesthetic complications    Cardiovascular status: acceptable  Respiratory status: acceptable  Hydration status: acceptable

## 2019-03-26 ENCOUNTER — HOSPITAL ENCOUNTER (OUTPATIENT)
Facility: HOSPITAL | Age: 79
Setting detail: HOSPITAL OUTPATIENT SURGERY
Discharge: HOME | End: 2019-03-26
Attending: INTERNAL MEDICINE | Admitting: INTERNAL MEDICINE

## 2019-03-26 ENCOUNTER — ANESTHESIA (OUTPATIENT)
Dept: GASTROENTEROLOGY | Facility: HOSPITAL | Age: 79
End: 2019-03-26

## 2019-03-26 ENCOUNTER — ANESTHESIA EVENT (OUTPATIENT)
Dept: GASTROENTEROLOGY | Facility: HOSPITAL | Age: 79
End: 2019-03-26

## 2019-03-26 VITALS
DIASTOLIC BLOOD PRESSURE: 69 MMHG | RESPIRATION RATE: 18 BRPM | OXYGEN SATURATION: 93 % | HEIGHT: 71 IN | HEART RATE: 64 BPM | WEIGHT: 229 LBS | SYSTOLIC BLOOD PRESSURE: 128 MMHG | TEMPERATURE: 96.8 F | BODY MASS INDEX: 32.06 KG/M2

## 2019-03-26 DIAGNOSIS — R19.8 ALTERED BOWEL FUNCTION: ICD-10-CM

## 2019-03-26 LAB — UREASE TISS QL: NEGATIVE

## 2019-03-26 PROCEDURE — 25010000002 PROPOFOL 10 MG/ML EMULSION: Performed by: NURSE ANESTHETIST, CERTIFIED REGISTERED

## 2019-03-26 PROCEDURE — 45385 COLONOSCOPY W/LESION REMOVAL: CPT | Performed by: INTERNAL MEDICINE

## 2019-03-26 PROCEDURE — 88305 TISSUE EXAM BY PATHOLOGIST: CPT | Performed by: INTERNAL MEDICINE

## 2019-03-26 RX ORDER — SODIUM CHLORIDE, SODIUM LACTATE, POTASSIUM CHLORIDE, CALCIUM CHLORIDE 600; 310; 30; 20 MG/100ML; MG/100ML; MG/100ML; MG/100ML
1000 INJECTION, SOLUTION INTRAVENOUS CONTINUOUS PRN
Status: DISCONTINUED | OUTPATIENT
Start: 2019-03-26 | End: 2019-03-26

## 2019-03-26 RX ORDER — SODIUM CHLORIDE 0.9 % (FLUSH) 0.9 %
3 SYRINGE (ML) INJECTION AS NEEDED
Status: DISCONTINUED | OUTPATIENT
Start: 2019-03-26 | End: 2019-03-26 | Stop reason: HOSPADM

## 2019-03-26 RX ORDER — SODIUM CHLORIDE 9 MG/ML
500 INJECTION, SOLUTION INTRAVENOUS CONTINUOUS PRN
Status: DISCONTINUED | OUTPATIENT
Start: 2019-03-26 | End: 2019-03-26 | Stop reason: HOSPADM

## 2019-03-26 RX ORDER — PROPOFOL 10 MG/ML
VIAL (ML) INTRAVENOUS AS NEEDED
Status: DISCONTINUED | OUTPATIENT
Start: 2019-03-26 | End: 2019-03-26 | Stop reason: SURG

## 2019-03-26 RX ORDER — LIDOCAINE HYDROCHLORIDE 20 MG/ML
INJECTION, SOLUTION INFILTRATION; PERINEURAL AS NEEDED
Status: DISCONTINUED | OUTPATIENT
Start: 2019-03-26 | End: 2019-03-26 | Stop reason: SURG

## 2019-03-26 RX ADMIN — PROPOFOL 30 MG: 10 INJECTION, EMULSION INTRAVENOUS at 07:42

## 2019-03-26 RX ADMIN — PROPOFOL 30 MG: 10 INJECTION, EMULSION INTRAVENOUS at 07:49

## 2019-03-26 RX ADMIN — LIDOCAINE HYDROCHLORIDE 100 MG: 20 INJECTION, SOLUTION INFILTRATION; PERINEURAL at 07:39

## 2019-03-26 RX ADMIN — PROPOFOL 60 MG: 10 INJECTION, EMULSION INTRAVENOUS at 07:39

## 2019-03-26 RX ADMIN — PROPOFOL 30 MG: 10 INJECTION, EMULSION INTRAVENOUS at 07:45

## 2019-03-26 RX ADMIN — PROPOFOL 30 MG: 10 INJECTION, EMULSION INTRAVENOUS at 07:40

## 2019-03-26 RX ADMIN — SODIUM CHLORIDE 500 ML: 9 INJECTION, SOLUTION INTRAVENOUS at 07:22

## 2019-03-26 RX ADMIN — PROPOFOL 30 MG: 10 INJECTION, EMULSION INTRAVENOUS at 07:44

## 2019-03-26 RX ADMIN — SODIUM CHLORIDE: 9 INJECTION, SOLUTION INTRAVENOUS at 07:32

## 2019-03-26 RX ADMIN — PROPOFOL 30 MG: 10 INJECTION, EMULSION INTRAVENOUS at 07:47

## 2019-03-26 NOTE — H&P
[facility] Gastroenterology  Pre Procedure History & Physical    Chief Complaint:   Change in Bowel Habits    Subjective     HPI:   Change in Bowel Habits    Past Medical History:   Past Medical History:   Diagnosis Date   • Coronary artery disease    • Disease of thyroid gland    • Elevated cholesterol    • GERD (gastroesophageal reflux disease)    • Hypertension    • Irregular heart beat        Past Surgical History:  Past Surgical History:   Procedure Laterality Date   • CARDIAC CATHETERIZATION     • CHOLECYSTECTOMY     • COLONOSCOPY  02/22/2016    5 polyps  all removed   • ENDOSCOPY  05/23/2014    resolved camp's   • ENDOSCOPY N/A 3/25/2019    Procedure: ESOPHAGOGASTRODUODENOSCOPY WITH ANESTHESIA;  Surgeon: Bayron Dillon DO;  Location: [facility] ENDOSCOPY;  Service: Gastroenterology   • EYE SURGERY         Family History:  Family History   Problem Relation Age of Onset   • Colon cancer Neg Hx    • Esophageal cancer Neg Hx        Social History:   reports that he has quit smoking. His smoking use included cigarettes. He has quit using smokeless tobacco. His smokeless tobacco use included chew. He reports that he does not drink alcohol or use drugs.    Medications:   Prior to Admission medications    Medication Sig Start Date End Date Taking? Authorizing Provider   aspirin 81 MG EC tablet Take 81 mg by mouth Daily.   Yes Juan Jose Turner MD   lisinopril (PRINIVIL,ZESTRIL) 20 MG tablet Take 20 mg by mouth Daily.   Yes Juan Jose Turner MD   metoprolol tartrate (LOPRESSOR) 25 MG tablet Take 25 mg by mouth 2 (Two) Times a Day.   Yes Juan Jose Turner MD   atorvastatin (LIPITOR) 20 MG tablet Take 20 mg by mouth Daily.    Juan Jose Turner MD   citalopram (CeleXA) 20 MG tablet Take 20 mg by mouth Daily.    ProviderJuan Jose MD   finasteride (PROSCAR) 5 MG tablet Take 5 mg by mouth Daily.    Juan Jose Turner MD   levothyroxine (SYNTHROID, LEVOTHROID) 25 MCG tablet Take 25 mcg by mouth  "Daily.    Juan Jose Turner MD   Multiple Vitamins-Minerals (ICAPS AREDS 2 PO) Take  by mouth.    Juan Jose Turner MD   omeprazole (priLOSEC) 40 MG capsule Take 40 mg by mouth Daily.    Juan Jose Turner MD   Sennosides-Docusate Sodium (STOOL SOFTENER/LAXATIVE PO) Take  by mouth.    Juan Jose Turner MD   warfarin (COUMADIN) 5 MG tablet Take 5 mg by mouth Daily.    Juan Jose Turner MD       Allergies:  Penicillins    ROS:    General: Weight stable  Resp: No SOA  Cardiovascular: No CP    Objective     Blood pressure 139/72, pulse 75, temperature 96.8 °F (36 °C), temperature source Temporal, resp. rate 20, height 180.3 cm (71\"), weight 104 kg (229 lb), SpO2 97 %.    Physical Exam   Constitutional: Pt is oriented to person, place, and in no distress.   HENT: Mouth/Throat: Oropharynx is clear.   Cardiovascular: Normal rate, regular rhythm.    Pulmonary/Chest: Effort normal. No respiratory distress. No  wheezes.   Abdominal: Soft. Non-distended.  Skin: Skin is warm and dry.   Psychiatric: Mood, memory, affect and judgment appear normal.     Assessment/Plan     Diagnosis:  Change in Bowel habits    Anticipated Surgical Procedure:  C-scope    The risks, benefits, and alternatives of this procedure have been discussed with the patient or the responsible party- the patient understands and agrees to proceed.        "

## 2019-03-26 NOTE — ANESTHESIA PREPROCEDURE EVALUATION
Anesthesia Evaluation     Patient summary reviewed   no history of anesthetic complications:  NPO Solid Status: > 8 hours  NPO Liquid Status: > 2 hours           Airway   Mallampati: II  TM distance: >3 FB  Neck ROM: full  Dental    (+) upper dentures and lower dentures    Pulmonary - negative pulmonary ROS   Cardiovascular   Exercise tolerance: excellent (>7 METS)    Patient on routine beta blocker    (+) hypertension, CAD, cardiac stents (2010) dysrhythmias Atrial Fib, hyperlipidemia,       Neuro/Psych- negative ROS  GI/Hepatic/Renal/Endo    (+) obesity,  GERD,  hypothyroidism,     Musculoskeletal     Abdominal    Substance History      OB/GYN          Other                          Anesthesia Plan    ASA 3     MAC     intravenous induction   Anesthetic plan, all risks, benefits, and alternatives have been provided, discussed and informed consent has been obtained with: patient.

## 2019-03-26 NOTE — ANESTHESIA POSTPROCEDURE EVALUATION
Patient: Samir Yee    Procedure Summary     Date:  03/26/19 Room / Location:  [facility] ENDOSCOPY 2 /  [facility]    Anesthesia Start:  0732 Anesthesia Stop:  0755    Procedure:  COLONOSCOPY WITH ANESTHESIA (N/A ) Diagnosis:       Altered bowel function      (Altered bowel function [R19.8])    Surgeon:  Bayron Dillon DO Provider:  Jarrett Courtney CRNA    Anesthesia Type:  MAC ASA Status:  3          Anesthesia Type: MAC  Last vitals  BP   139/72 (03/26/19 0653)   Temp   96.8 °F (36 °C) (03/26/19 0653)   Pulse   75 (03/26/19 0653)   Resp   20 (03/26/19 0653)     SpO2   97 % (03/26/19 0653)     Post Anesthesia Care and Evaluation    Patient location during evaluation: PHASE II  Patient participation: complete - patient participated  Level of consciousness: awake  Pain score: 0  Pain management: adequate  Airway patency: patent  Anesthetic complications: No anesthetic complications  PONV Status: none  Cardiovascular status: acceptable  Respiratory status: acceptable  Hydration status: acceptable

## 2019-03-27 LAB
LAB AP CASE REPORT: NORMAL
PATH REPORT.FINAL DX SPEC: NORMAL
PATH REPORT.GROSS SPEC: NORMAL

## 2019-04-03 ENCOUNTER — TELEPHONE (OUTPATIENT)
Dept: GASTROENTEROLOGY | Facility: CLINIC | Age: 79
End: 2019-04-03

## 2019-05-16 ENCOUNTER — TELEPHONE (OUTPATIENT)
Dept: PRIMARY CARE CLINIC | Age: 79
End: 2019-05-16

## 2019-05-16 ENCOUNTER — ANTI-COAG VISIT (OUTPATIENT)
Dept: PRIMARY CARE CLINIC | Age: 79
End: 2019-05-16

## 2019-05-16 DIAGNOSIS — Z95.5 S/P CORONARY ARTERY STENT PLACEMENT: ICD-10-CM

## 2019-05-16 DIAGNOSIS — I47.10 SVT (SUPRAVENTRICULAR TACHYCARDIA): ICD-10-CM

## 2019-05-16 LAB
INR BLD: 3.06 (ref 0.88–1.18)
PROTHROMBIN TIME: 30.8 SEC (ref 12–14.6)

## 2019-05-16 NOTE — TELEPHONE ENCOUNTER
----- Message from CONNOR Albert sent at 5/16/2019  3:45 PM CDT -----  Please call patient and let them know results.    Continue current Coumadin dosage and repeat pro time in 1 month

## 2019-05-28 RX ORDER — LEVOTHYROXINE SODIUM 0.03 MG/1
25 TABLET ORAL DAILY
Qty: 90 TABLET | Refills: 0 | Status: SHIPPED | OUTPATIENT
Start: 2019-05-28 | End: 2019-09-10 | Stop reason: SDUPTHER

## 2019-05-28 NOTE — TELEPHONE ENCOUNTER
Received fax from pharmacy requesting refill on pts medication(s). Pt was last seen in office on 8/1/2018  and has a follow up scheduled for Visit date not found. Will send request to  Dr. Kenney Craven  for patient.      Requested Prescriptions     Pending Prescriptions Disp Refills    levothyroxine (SYNTHROID) 25 MCG tablet [Pharmacy Med Name: LEVOTHYROXIN 25MCG TAB] 30 tablet 5     Sig: TAKE 1 TABLET BY MOUTH ONCE DAILY

## 2019-06-03 RX ORDER — WARFARIN SODIUM 5 MG/1
TABLET ORAL
Qty: 45 TABLET | Refills: 2 | Status: SHIPPED | OUTPATIENT
Start: 2019-06-03 | End: 2019-01-01 | Stop reason: SDUPTHER

## 2019-06-03 NOTE — TELEPHONE ENCOUNTER
Received fax from pharmacy requesting refill on pts medication(s). Pt was last seen in office on 8/1/2018  and has a follow up scheduled for Visit date not found. Will send request to  Dr. Sarah Campbell  for patient.      Requested Prescriptions     Pending Prescriptions Disp Refills    warfarin (COUMADIN) 5 MG tablet [Pharmacy Med Name: WARFARIN 5MG        TAB] 45 tablet 2     Sig: TAKE 1 & 1/2 (ONE & ONE-HALF) TABLETS BY MOUTH ONCE DAILY

## 2019-07-15 RX ORDER — WARFARIN SODIUM 5 MG/1
TABLET ORAL
Qty: 30 TABLET | Refills: 2 | Status: SHIPPED | OUTPATIENT
Start: 2019-07-15 | End: 2019-09-12 | Stop reason: SDUPTHER

## 2019-07-23 ENCOUNTER — OFFICE VISIT (OUTPATIENT)
Dept: CARDIOLOGY | Age: 79
End: 2019-07-23
Payer: MEDICARE

## 2019-07-23 VITALS
HEART RATE: 62 BPM | WEIGHT: 228 LBS | BODY MASS INDEX: 30.88 KG/M2 | HEIGHT: 72 IN | DIASTOLIC BLOOD PRESSURE: 82 MMHG | SYSTOLIC BLOOD PRESSURE: 128 MMHG

## 2019-07-23 DIAGNOSIS — I10 ESSENTIAL HYPERTENSION: ICD-10-CM

## 2019-07-23 DIAGNOSIS — I25.10 CORONARY ARTERY DISEASE INVOLVING NATIVE CORONARY ARTERY OF NATIVE HEART WITHOUT ANGINA PECTORIS: ICD-10-CM

## 2019-07-23 DIAGNOSIS — I48.19 PERSISTENT ATRIAL FIBRILLATION (HCC): Primary | ICD-10-CM

## 2019-07-23 LAB
INTERNATIONAL NORMALIZATION RATIO, POC: 2.6
PROTHROMBIN TIME, POC: NORMAL

## 2019-07-23 PROCEDURE — G8417 CALC BMI ABV UP PARAM F/U: HCPCS | Performed by: CLINICAL NURSE SPECIALIST

## 2019-07-23 PROCEDURE — G8598 ASA/ANTIPLAT THER USED: HCPCS | Performed by: CLINICAL NURSE SPECIALIST

## 2019-07-23 PROCEDURE — G8427 DOCREV CUR MEDS BY ELIG CLIN: HCPCS | Performed by: CLINICAL NURSE SPECIALIST

## 2019-07-23 PROCEDURE — 85610 PROTHROMBIN TIME: CPT | Performed by: CLINICAL NURSE SPECIALIST

## 2019-07-23 PROCEDURE — 1036F TOBACCO NON-USER: CPT | Performed by: CLINICAL NURSE SPECIALIST

## 2019-07-23 PROCEDURE — 4040F PNEUMOC VAC/ADMIN/RCVD: CPT | Performed by: CLINICAL NURSE SPECIALIST

## 2019-07-23 PROCEDURE — 1123F ACP DISCUSS/DSCN MKR DOCD: CPT | Performed by: CLINICAL NURSE SPECIALIST

## 2019-07-23 PROCEDURE — 93000 ELECTROCARDIOGRAM COMPLETE: CPT | Performed by: CLINICAL NURSE SPECIALIST

## 2019-07-23 PROCEDURE — 99214 OFFICE O/P EST MOD 30 MIN: CPT | Performed by: CLINICAL NURSE SPECIALIST

## 2019-07-23 NOTE — PROGRESS NOTES
[address] [place], [address] [address]  Phone: [phone]  Fax: [phone]    OFFICE VISIT:  2019    Palomo Brian : 1940    Reason For Visit:  Ozzy Johnston is a 66 y.o. male who is here for 6 Month Follow-Up; Atrial Fibrillation (Persistent); and Coronary Artery Disease  History of nonocclusive coronary disease with last heart catheterization . Persistent atrial fibrillation and patient has been asymptomatic. Had 2D echo in January that showed EF 45% with normal LV size. Grade 1 diastolic dysfunction and no significant valvular disease. Mildly dilated LA. Decision was made to rate control and anticoagulation with Coumadin    He returns today for routine follow-up. He states he has YORK but this is unchanged. Has occasional dizziness but has not had any spells in over a year. He is not very active. He has not noticed any abnormal bleeding being on his Coumadin. Dr. Chio Aragon usually manages his INRs. Subjective  Ozzy Johnston denies exertional chest pain. Has YORK. No resting shortness of breath, orthopnea, paroxysmal nocturnal dyspnea, syncope, presyncope, arrhythmia, edema and fatigue. The patient denies numbness or weakness to suggest cerebrovascular accident or transient ischemic attack. Ben Malone DO is PCP and follows labs including lipids and INR.   Tameka Franklin has the following history as recorded in [facility]:    Patient Active Problem List    Diagnosis Date Noted    Benign prostatic hyperplasia with urinary hesitancy 2018    S/P coronary artery stent placement 2017    Anxiety     Insomnia     Hypertension     Vertigo 02/10/2014    Gastroesophageal reflux disease     CAD (coronary artery disease)     Mitral valve disorder     [facility]     Hyperlipidemia     Coronary artery disease 2011    Supraventricular tachycardia ([address] 2011     Past Medical History:   Diagnosis Date  Anxiety     CAD (coronary artery disease)     Gastroesophageal reflux disease     Hyperlipidemia     Cholesterol management per pcpEDITH M.D.   [facility] Hypertension     Insomnia     Mitral valve disorder     Pancreatitis     SVT [facility]ricular tachycardia) ([facility])      Past Surgical History:   Procedure Laterality Date    APPENDECTOMY      CARDIAC CATHETERIZATION  10/3/11    CARDIAC CATHETERIZATION  1987    Normal left ventricular function and hemodynamics , Normal coronary arteriograms    CHOLECYSTECTOMY      CORONARY ANGIOPLASTY WITH STENT PLACEMENT      DIAGNOSTIC CARDIAC CATH LAB PROCEDURE  3/24/10    EF over 60%  Normal left ventricular function and hemodynamics, Diffuse nonocclusive CAD , w/ a patent stent in the circumflex marginal branch    DIAGNOSTIC CARDIAC CATH LAB PROCEDURE  10/28/08    EF 60% Normal LV function and hemodynamics, Diffuse non-occlusive CAD     DIAGNOSTIC CARDIAC CATH LAB PROCEDURE  07    EF over 60% Normal LV chamber size and wall motion, Diffuse nonocclusive CAD     DIAGNOSTIC CARDIAC CATH LAB PROCEDURE  06    EF over 60%  Normal LV function and hemodynamics, Severe two- vessel CAD , Successful percutaneous coronary intevention w/cuttng balloon angioplasty to a small second circumflex marginal branch and primary stent placement using a drug eluting stent to the diagonal branch of the LAD     DIAGNOSTIC CARDIAC CATH LAB PROCEDURE  03    EF over 60% Normal LV function and hemodynamics Mild diffuse nonocclusive CAD w/o hemodynamically significant lesions identified      Family History   Problem Relation Age of Onset    Hypertension Mother     Cancer Father     Diabetes Brother      Social History     Tobacco Use    Smoking status: Former Smoker     Packs/day: 2.00     Years: 40.00     Pack years: 80.00     Last attempt to quit: 1989     Years since quittin.9    Smokeless tobacco: Never Used   Substance Use Topics    Alcohol use:  No

## 2019-07-26 DIAGNOSIS — F41.9 ANXIETY: ICD-10-CM

## 2019-07-26 DIAGNOSIS — F32.A DEPRESSION, UNSPECIFIED DEPRESSION TYPE: ICD-10-CM

## 2019-07-26 DIAGNOSIS — N40.1 BENIGN NON-NODULAR PROSTATIC HYPERPLASIA WITH LOWER URINARY TRACT SYMPTOMS: ICD-10-CM

## 2019-07-26 RX ORDER — CITALOPRAM 40 MG/1
40 TABLET ORAL DAILY
Qty: 30 TABLET | Refills: 0 | Status: SHIPPED | OUTPATIENT
Start: 2019-07-26 | End: 2019-09-10 | Stop reason: SDUPTHER

## 2019-07-26 RX ORDER — FINASTERIDE 5 MG/1
5 TABLET, FILM COATED ORAL DAILY
Qty: 30 TABLET | Refills: 0 | Status: SHIPPED | OUTPATIENT
Start: 2019-07-26 | End: 2019-09-01 | Stop reason: SDUPTHER

## 2019-07-26 RX ORDER — ATORVASTATIN CALCIUM 20 MG/1
20 TABLET, FILM COATED ORAL NIGHTLY
Qty: 30 TABLET | Refills: 0 | Status: SHIPPED | OUTPATIENT
Start: 2019-07-26 | End: 2019-09-10 | Stop reason: SDUPTHER

## 2019-08-03 DIAGNOSIS — I10 ESSENTIAL HYPERTENSION: ICD-10-CM

## 2019-08-06 RX ORDER — LISINOPRIL 10 MG/1
TABLET ORAL
Qty: 90 TABLET | Refills: 0 | Status: SHIPPED | OUTPATIENT
Start: 2019-08-06 | End: 2019-01-01 | Stop reason: SDUPTHER

## 2019-09-01 DIAGNOSIS — N40.1 BENIGN NON-NODULAR PROSTATIC HYPERPLASIA WITH LOWER URINARY TRACT SYMPTOMS: ICD-10-CM

## 2019-09-04 RX ORDER — FINASTERIDE 5 MG/1
5 TABLET, FILM COATED ORAL DAILY
Qty: 30 TABLET | Refills: 5 | Status: SHIPPED | OUTPATIENT
Start: 2019-09-04 | End: 2020-01-01 | Stop reason: SDUPTHER

## 2019-09-06 DIAGNOSIS — F41.9 ANXIETY: ICD-10-CM

## 2019-09-06 DIAGNOSIS — F32.A DEPRESSION, UNSPECIFIED DEPRESSION TYPE: ICD-10-CM

## 2019-09-10 DIAGNOSIS — F41.9 ANXIETY: ICD-10-CM

## 2019-09-10 DIAGNOSIS — F32.A DEPRESSION, UNSPECIFIED DEPRESSION TYPE: ICD-10-CM

## 2019-09-10 RX ORDER — CITALOPRAM 40 MG/1
40 TABLET ORAL DAILY
Qty: 30 TABLET | Refills: 0 | Status: SHIPPED | OUTPATIENT
Start: 2019-09-10 | End: 2019-01-01 | Stop reason: SDUPTHER

## 2019-09-10 RX ORDER — LEVOTHYROXINE SODIUM 0.03 MG/1
25 TABLET ORAL DAILY
Qty: 30 TABLET | Refills: 0 | Status: SHIPPED | OUTPATIENT
Start: 2019-09-10 | End: 2020-01-01

## 2019-09-10 RX ORDER — LEVOTHYROXINE SODIUM 0.03 MG/1
TABLET ORAL
Qty: 90 TABLET | Refills: 0 | OUTPATIENT
Start: 2019-09-10

## 2019-09-10 RX ORDER — CITALOPRAM 40 MG/1
TABLET ORAL
Qty: 30 TABLET | Refills: 0 | OUTPATIENT
Start: 2019-09-10

## 2019-09-10 RX ORDER — ATORVASTATIN CALCIUM 20 MG/1
20 TABLET, FILM COATED ORAL NIGHTLY
Qty: 30 TABLET | Refills: 0 | Status: SHIPPED | OUTPATIENT
Start: 2019-09-10 | End: 2019-01-01 | Stop reason: SDUPTHER

## 2019-09-10 RX ORDER — OMEPRAZOLE 40 MG/1
CAPSULE, DELAYED RELEASE ORAL
Qty: 90 CAPSULE | Refills: 3 | OUTPATIENT
Start: 2019-09-10

## 2019-09-12 ENCOUNTER — OFFICE VISIT (OUTPATIENT)
Dept: PRIMARY CARE CLINIC | Age: 79
End: 2019-09-12
Payer: MEDICARE

## 2019-09-12 VITALS
BODY MASS INDEX: 31.81 KG/M2 | DIASTOLIC BLOOD PRESSURE: 70 MMHG | OXYGEN SATURATION: 97 % | TEMPERATURE: 97 F | SYSTOLIC BLOOD PRESSURE: 114 MMHG | WEIGHT: 227.25 LBS | HEIGHT: 71 IN | HEART RATE: 57 BPM

## 2019-09-12 DIAGNOSIS — Z91.09 ENVIRONMENTAL ALLERGIES: ICD-10-CM

## 2019-09-12 DIAGNOSIS — Z79.01 ANTICOAGULATED ON COUMADIN: ICD-10-CM

## 2019-09-12 DIAGNOSIS — H66.002 NON-RECURRENT ACUTE SUPPURATIVE OTITIS MEDIA OF LEFT EAR WITHOUT SPONTANEOUS RUPTURE OF TYMPANIC MEMBRANE: Primary | ICD-10-CM

## 2019-09-12 PROCEDURE — G8598 ASA/ANTIPLAT THER USED: HCPCS | Performed by: PEDIATRICS

## 2019-09-12 PROCEDURE — G8417 CALC BMI ABV UP PARAM F/U: HCPCS | Performed by: PEDIATRICS

## 2019-09-12 PROCEDURE — 1036F TOBACCO NON-USER: CPT | Performed by: PEDIATRICS

## 2019-09-12 PROCEDURE — 4040F PNEUMOC VAC/ADMIN/RCVD: CPT | Performed by: PEDIATRICS

## 2019-09-12 PROCEDURE — G8427 DOCREV CUR MEDS BY ELIG CLIN: HCPCS | Performed by: PEDIATRICS

## 2019-09-12 PROCEDURE — 99213 OFFICE O/P EST LOW 20 MIN: CPT | Performed by: PEDIATRICS

## 2019-09-12 PROCEDURE — 1123F ACP DISCUSS/DSCN MKR DOCD: CPT | Performed by: PEDIATRICS

## 2019-09-12 RX ORDER — DOXYCYCLINE HYCLATE 100 MG
100 TABLET ORAL 2 TIMES DAILY
Qty: 20 TABLET | Refills: 0 | Status: SHIPPED | OUTPATIENT
Start: 2019-09-12 | End: 2019-09-22

## 2019-09-12 RX ORDER — FLUTICASONE PROPIONATE 50 MCG
2 SPRAY, SUSPENSION (ML) NASAL DAILY
Qty: 3 BOTTLE | Refills: 1 | Status: SHIPPED | OUTPATIENT
Start: 2019-09-12 | End: 2020-01-01

## 2019-09-12 ASSESSMENT — ENCOUNTER SYMPTOMS
EYES NEGATIVE: 1
CONSTIPATION: 1
ALLERGIC/IMMUNOLOGIC NEGATIVE: 1
SINUS PRESSURE: 1
RHINORRHEA: 1
COUGH: 1

## 2019-09-12 ASSESSMENT — PATIENT HEALTH QUESTIONNAIRE - PHQ9
SUM OF ALL RESPONSES TO PHQ9 QUESTIONS 1 & 2: 0
SUM OF ALL RESPONSES TO PHQ QUESTIONS 1-9: 0
1. LITTLE INTEREST OR PLEASURE IN DOING THINGS: 0
SUM OF ALL RESPONSES TO PHQ QUESTIONS 1-9: 0
2. FEELING DOWN, DEPRESSED OR HOPELESS: 0

## 2019-09-12 NOTE — PROGRESS NOTES
(PRILOSEC) 40 MG delayed release capsule Take 1 capsule by mouth daily 90 capsule 3    Multiple Vitamins-Minerals (EYE VITAMINS PO) Take 2 tablets by mouth daily       aspirin 81 MG EC tablet Take 81 mg by mouth daily.  warfarin (COUMADIN) 5 MG tablet TAKE 1 & 1/2 (ONE & ONE-HALF) TABLETS BY MOUTH ONCE DAILY 45 tablet 2     No current facility-administered medications for this visit. Allergies: Pcn [penicillins]     Past Medical History:   Diagnosis Date    Anxiety     CAD (coronary artery disease)     Gastroesophageal reflux disease     Hyperlipidemia     Cholesterol management per EDITH unger M.D.   [facility] Hypertension     Insomnia     Mitral valve disorder     Pancreatitis     [facility])        Family History   Problem Relation Age of Onset    Hypertension Mother     Cancer Father     Diabetes Brother        Past Surgical History:   Procedure Laterality Date    APPENDECTOMY      CARDIAC CATHETERIZATION  10/3/11    CARDIAC CATHETERIZATION  9/1/1987    Normal left ventricular function and hemodynamics , Normal coronary arteriograms    CHOLECYSTECTOMY      CORONARY ANGIOPLASTY WITH STENT PLACEMENT      DIAGNOSTIC CARDIAC CATH LAB PROCEDURE  3/24/10    EF over 60%  Normal left ventricular function and hemodynamics, Diffuse nonocclusive CAD , w/ a patent stent in the circumflex marginal branch    DIAGNOSTIC CARDIAC CATH LAB PROCEDURE  10/28/08    EF 60% Normal LV function and hemodynamics, Diffuse non-occlusive CAD     DIAGNOSTIC CARDIAC CATH LAB PROCEDURE  5/8/07    EF over 60% Normal LV chamber size and wall motion, Diffuse nonocclusive CAD     DIAGNOSTIC CARDIAC CATH LAB PROCEDURE  4/11/06    EF over 60%  Normal LV function and hemodynamics, Severe two- vessel CAD , Successful percutaneous coronary intevention w/cuttng balloon angioplasty to a small second circumflex marginal branch and primary stent placement using a drug eluting stent to the diagonal branch of the LAD     DIAGNOSTIC CARDIAC CATH LAB PROCEDURE  03    EF over 60% Normal LV function and hemodynamics Mild diffuse nonocclusive CAD w/o hemodynamically significant lesions identified        Social History     Tobacco Use    Smoking status: Former Smoker     Packs/day: 2.00     Years: 40.00     Pack years: 80.00     Last attempt to quit: 1989     Years since quittin.1    Smokeless tobacco: Never Used   Substance Use Topics    Alcohol use: No        Review of Systems   Constitutional: Negative. Negative for chills and fever. HENT: Positive for congestion (long term), ear pain (left), rhinorrhea (clear but thick) and sinus pressure. Eyes: Negative. Respiratory: Positive for cough (np). Cardiovascular: Negative. Gastrointestinal: Positive for constipation. Endocrine: Negative. Genitourinary: Negative. Musculoskeletal: Negative. Skin: Negative. Allergic/Immunologic: Negative. Neurological: Negative. Hematological: Negative. Psychiatric/Behavioral: Negative. Physical Exam   Constitutional: He is oriented to person, place, and time. He appears well-developed and well-nourished. No distress. HENT:   Head: Normocephalic and atraumatic. Right Ear: Hearing, tympanic membrane, external ear and ear canal normal.   Left Ear: External ear normal. Tympanic membrane is injected, erythematous and bulging. Decreased hearing is noted. Nose: Mucosal edema and rhinorrhea (clear) present. Right sinus exhibits no maxillary sinus tenderness and no frontal sinus tenderness. Left sinus exhibits no maxillary sinus tenderness and no frontal sinus tenderness. Mouth/Throat: Posterior oropharyngeal erythema (with some cobblestoning) present. Eyes: Pupils are equal, round, and reactive to light. Conjunctivae and EOM are normal. No scleral icterus. Neck: Normal range of motion. Neck supple. No JVD present. Carotid bruit is not present.  No thyromegaly present. Cardiovascular: Normal rate, S1 normal, S2 normal and normal heart sounds. An irregularly irregular rhythm present. No extrasystoles are present. PMI is not displaced. Exam reveals no gallop and no friction rub. No murmur heard. Pulmonary/Chest: Effort normal and breath sounds normal. No respiratory distress. He has no wheezes. He has no rhonchi. He has no rales. Abdominal: Soft. Bowel sounds are normal. There is no hepatosplenomegaly. There is no tenderness. There is no rebound, no guarding and no CVA tenderness. Genitourinary:   Genitourinary Comments: Exam deferred   Musculoskeletal: Normal range of motion. He exhibits no edema or tenderness. Heberden's and chris's nodes   Lymphadenopathy:     He has no cervical adenopathy. Neurological: He is alert and oriented to person, place, and time. He has normal strength. No sensory deficit (no numbness or tingling). Skin: Skin is warm and dry. No rash noted. Psychiatric: He has a normal mood and affect. ASSESSMENT    No diagnosis found. PLAN    No diagnosis found. No orders of the defined types were placed in this encounter. No follow-ups on file.

## 2019-09-16 RX ORDER — ATORVASTATIN CALCIUM 20 MG/1
20 TABLET, FILM COATED ORAL NIGHTLY
Qty: 30 TABLET | Refills: 0 | OUTPATIENT
Start: 2019-09-16

## 2019-09-18 DIAGNOSIS — Z79.01 ANTICOAGULATED ON COUMADIN: ICD-10-CM

## 2019-09-18 LAB
INR BLD: 2.38 (ref 0.88–1.18)
PROTHROMBIN TIME: 25.2 SEC (ref 12–14.6)

## 2019-09-19 ENCOUNTER — TELEPHONE (OUTPATIENT)
Dept: PRIMARY CARE CLINIC | Age: 79
End: 2019-09-19

## 2019-09-19 ENCOUNTER — ANTI-COAG VISIT (OUTPATIENT)
Dept: PRIMARY CARE CLINIC | Age: 79
End: 2019-09-19

## 2020-01-01 ENCOUNTER — TELEPHONE (OUTPATIENT)
Dept: PRIMARY CARE CLINIC | Age: 80
End: 2020-01-01

## 2020-01-01 ENCOUNTER — TELEPHONE (OUTPATIENT)
Dept: CARDIOLOGY | Age: 80
End: 2020-01-01

## 2020-01-01 ENCOUNTER — OFFICE VISIT (OUTPATIENT)
Dept: CARDIOLOGY | Age: 80
End: 2020-01-01
Payer: MEDICARE

## 2020-01-01 ENCOUNTER — VIRTUAL VISIT (OUTPATIENT)
Dept: PRIMARY CARE CLINIC | Age: 80
End: 2020-01-01
Payer: MEDICARE

## 2020-01-01 ENCOUNTER — TELEPHONE (OUTPATIENT)
Dept: ADMINISTRATIVE | Age: 80
End: 2020-01-01

## 2020-01-01 ENCOUNTER — APPOINTMENT (OUTPATIENT)
Dept: GENERAL RADIOLOGY | Age: 80
DRG: 243 | End: 2020-01-01
Payer: MEDICARE

## 2020-01-01 ENCOUNTER — OFFICE VISIT (OUTPATIENT)
Dept: PRIMARY CARE CLINIC | Age: 80
End: 2020-01-01
Payer: MEDICARE

## 2020-01-01 ENCOUNTER — ANTI-COAG VISIT (OUTPATIENT)
Dept: PRIMARY CARE CLINIC | Age: 80
End: 2020-01-01

## 2020-01-01 ENCOUNTER — HOSPITAL ENCOUNTER (OUTPATIENT)
Dept: NON INVASIVE DIAGNOSTICS | Age: 80
Discharge: HOME | End: 2020-03-16
Payer: MEDICARE

## 2020-01-01 ENCOUNTER — HOSPITAL ENCOUNTER (OUTPATIENT)
Dept: GENERAL RADIOLOGY | Age: 80
Discharge: HOME | End: 2020-07-10
Payer: MEDICARE

## 2020-01-01 ENCOUNTER — NURSE ONLY (OUTPATIENT)
Dept: CARDIOLOGY | Age: 80
End: 2020-01-01

## 2020-01-01 ENCOUNTER — HOSPITAL ENCOUNTER (INPATIENT)
Age: 80
LOS: 3 days | Discharge: HOME | DRG: 243 | End: 2020-03-26
Attending: EMERGENCY MEDICINE | Admitting: INTERNAL MEDICINE
Payer: MEDICARE

## 2020-01-01 VITALS
HEART RATE: 70 BPM | SYSTOLIC BLOOD PRESSURE: 100 MMHG | WEIGHT: 212 LBS | HEIGHT: 72 IN | DIASTOLIC BLOOD PRESSURE: 68 MMHG | BODY MASS INDEX: 28.71 KG/M2

## 2020-01-01 VITALS
DIASTOLIC BLOOD PRESSURE: 70 MMHG | OXYGEN SATURATION: 97 % | HEART RATE: 66 BPM | HEIGHT: 72 IN | TEMPERATURE: 96.4 F | WEIGHT: 211 LBS | SYSTOLIC BLOOD PRESSURE: 110 MMHG | BODY MASS INDEX: 28.58 KG/M2

## 2020-01-01 VITALS
HEART RATE: 68 BPM | HEIGHT: 72 IN | DIASTOLIC BLOOD PRESSURE: 82 MMHG | BODY MASS INDEX: 28.58 KG/M2 | WEIGHT: 211 LBS | SYSTOLIC BLOOD PRESSURE: 134 MMHG | TEMPERATURE: 97.6 F | OXYGEN SATURATION: 98 %

## 2020-01-01 VITALS
WEIGHT: 217 LBS | OXYGEN SATURATION: 96 % | HEART RATE: 64 BPM | SYSTOLIC BLOOD PRESSURE: 132 MMHG | DIASTOLIC BLOOD PRESSURE: 88 MMHG | HEIGHT: 72 IN | BODY MASS INDEX: 29.39 KG/M2

## 2020-01-01 VITALS
DIASTOLIC BLOOD PRESSURE: 72 MMHG | BODY MASS INDEX: 29.12 KG/M2 | WEIGHT: 215 LBS | SYSTOLIC BLOOD PRESSURE: 136 MMHG | HEART RATE: 60 BPM | HEIGHT: 72 IN

## 2020-01-01 VITALS
DIASTOLIC BLOOD PRESSURE: 68 MMHG | BODY MASS INDEX: 28.66 KG/M2 | HEIGHT: 72 IN | WEIGHT: 211.6 LBS | TEMPERATURE: 97.7 F | SYSTOLIC BLOOD PRESSURE: 132 MMHG | OXYGEN SATURATION: 94 % | HEART RATE: 77 BPM | RESPIRATION RATE: 18 BRPM

## 2020-01-01 VITALS
SYSTOLIC BLOOD PRESSURE: 139 MMHG | HEART RATE: 77 BPM | BODY MASS INDEX: 29.12 KG/M2 | WEIGHT: 215 LBS | HEIGHT: 72 IN | DIASTOLIC BLOOD PRESSURE: 79 MMHG

## 2020-01-01 VITALS — BODY MASS INDEX: 29.02 KG/M2 | WEIGHT: 214 LBS

## 2020-01-01 VITALS
HEIGHT: 72 IN | HEART RATE: 76 BPM | WEIGHT: 217 LBS | BODY MASS INDEX: 29.39 KG/M2 | SYSTOLIC BLOOD PRESSURE: 126 MMHG | DIASTOLIC BLOOD PRESSURE: 84 MMHG

## 2020-01-01 DIAGNOSIS — Z12.5 ENCOUNTER FOR SCREENING FOR MALIGNANT NEOPLASM OF PROSTATE: ICD-10-CM

## 2020-01-01 DIAGNOSIS — Z95.5 S/P CORONARY ARTERY STENT PLACEMENT: ICD-10-CM

## 2020-01-01 DIAGNOSIS — R30.0 DYSURIA: ICD-10-CM

## 2020-01-01 DIAGNOSIS — Z95.0 S/P PLACEMENT OF CARDIAC PACEMAKER: ICD-10-CM

## 2020-01-01 DIAGNOSIS — N40.1 BENIGN PROSTATIC HYPERPLASIA WITH URINARY HESITANCY: ICD-10-CM

## 2020-01-01 DIAGNOSIS — I10 ESSENTIAL HYPERTENSION: ICD-10-CM

## 2020-01-01 DIAGNOSIS — Z79.01 ANTICOAGULATED ON COUMADIN: ICD-10-CM

## 2020-01-01 DIAGNOSIS — I25.10 CORONARY ARTERY DISEASE INVOLVING NATIVE CORONARY ARTERY OF NATIVE HEART WITHOUT ANGINA PECTORIS: ICD-10-CM

## 2020-01-01 DIAGNOSIS — E78.2 MIXED HYPERLIPIDEMIA: ICD-10-CM

## 2020-01-01 DIAGNOSIS — I50.9 CONGESTIVE HEART FAILURE, UNSPECIFIED HF CHRONICITY, UNSPECIFIED HEART FAILURE TYPE (HCC): ICD-10-CM

## 2020-01-01 DIAGNOSIS — R39.11 BENIGN PROSTATIC HYPERPLASIA WITH URINARY HESITANCY: ICD-10-CM

## 2020-01-01 DIAGNOSIS — I48.0 PAROXYSMAL ATRIAL FIBRILLATION (HCC): ICD-10-CM

## 2020-01-01 DIAGNOSIS — R31.9 HEMATURIA, UNSPECIFIED TYPE: ICD-10-CM

## 2020-01-01 DIAGNOSIS — I47.10 SVT (SUPRAVENTRICULAR TACHYCARDIA): ICD-10-CM

## 2020-01-01 LAB
ALBUMIN SERPL-MCNC: 3.2 G/DL (ref 3.5–5.2)
ALBUMIN SERPL-MCNC: 3.8 G/DL (ref 3.5–5.2)
ALBUMIN SERPL-MCNC: 3.9 G/DL (ref 3.5–5.2)
ALBUMIN SERPL-MCNC: 4.1 G/DL (ref 3.5–5.2)
ALBUMIN SERPL-MCNC: 4.3 G/DL (ref 3.5–5.2)
ALP BLD-CCNC: 113 U/L (ref 40–130)
ALP BLD-CCNC: 115 U/L (ref 40–130)
ALP BLD-CCNC: 125 U/L (ref 40–130)
ALP BLD-CCNC: 144 U/L (ref 40–130)
ALP BLD-CCNC: 89 U/L (ref 40–130)
ALT SERPL-CCNC: 12 U/L (ref 5–41)
ALT SERPL-CCNC: 13 U/L (ref 5–41)
ALT SERPL-CCNC: 14 U/L (ref 5–41)
ALT SERPL-CCNC: 17 U/L (ref 5–41)
ALT SERPL-CCNC: 18 U/L (ref 5–41)
ANION GAP SERPL CALCULATED.3IONS-SCNC: 10 MMOL/L (ref 7–19)
ANION GAP SERPL CALCULATED.3IONS-SCNC: 11 MMOL/L (ref 7–19)
ANION GAP SERPL CALCULATED.3IONS-SCNC: 12 MMOL/L (ref 7–19)
ANION GAP SERPL CALCULATED.3IONS-SCNC: 13 MMOL/L (ref 7–19)
ANION GAP SERPL CALCULATED.3IONS-SCNC: 13 MMOL/L (ref 7–19)
ANION GAP SERPL CALCULATED.3IONS-SCNC: 14 MMOL/L (ref 7–19)
ANION GAP SERPL CALCULATED.3IONS-SCNC: 14 MMOL/L (ref 7–19)
ANION GAP SERPL CALCULATED.3IONS-SCNC: 15 MMOL/L (ref 7–19)
APTT: 32.7 SEC (ref 26–36.2)
AST SERPL-CCNC: 16 U/L (ref 5–40)
AST SERPL-CCNC: 16 U/L (ref 5–40)
AST SERPL-CCNC: 17 U/L (ref 5–40)
AST SERPL-CCNC: 18 U/L (ref 5–40)
AST SERPL-CCNC: 25 U/L (ref 5–40)
BASOPHILS ABSOLUTE: 0 K/UL (ref 0–0.2)
BASOPHILS ABSOLUTE: 0 K/UL (ref 0–0.2)
BASOPHILS ABSOLUTE: 0.1 K/UL (ref 0–0.2)
BASOPHILS RELATIVE PERCENT: 0.3 % (ref 0–1)
BASOPHILS RELATIVE PERCENT: 0.6 % (ref 0–1)
BASOPHILS RELATIVE PERCENT: 0.7 % (ref 0–1)
BILIRUB SERPL-MCNC: 0.4 MG/DL (ref 0.2–1.2)
BILIRUB SERPL-MCNC: 0.5 MG/DL (ref 0.2–1.2)
BILIRUB SERPL-MCNC: 0.6 MG/DL (ref 0.2–1.2)
BILIRUBIN URINE: NEGATIVE
BLOOD, URINE: NEGATIVE
BUN BLDV-MCNC: 10 MG/DL (ref 8–23)
BUN BLDV-MCNC: 12 MG/DL (ref 8–23)
BUN BLDV-MCNC: 12 MG/DL (ref 8–23)
BUN BLDV-MCNC: 15 MG/DL (ref 8–23)
BUN BLDV-MCNC: 16 MG/DL (ref 8–23)
BUN BLDV-MCNC: 16 MG/DL (ref 8–23)
BUN BLDV-MCNC: 19 MG/DL (ref 8–23)
BUN BLDV-MCNC: 22 MG/DL (ref 8–23)
CALCIUM SERPL-MCNC: 8.2 MG/DL (ref 8.8–10.2)
CALCIUM SERPL-MCNC: 8.3 MG/DL (ref 8.8–10.2)
CALCIUM SERPL-MCNC: 8.4 MG/DL (ref 8.8–10.2)
CALCIUM SERPL-MCNC: 8.5 MG/DL (ref 8.8–10.2)
CALCIUM SERPL-MCNC: 8.5 MG/DL (ref 8.8–10.2)
CALCIUM SERPL-MCNC: 8.7 MG/DL (ref 8.8–10.2)
CALCIUM SERPL-MCNC: 9.1 MG/DL (ref 8.8–10.2)
CALCIUM SERPL-MCNC: 9.1 MG/DL (ref 8.8–10.2)
CHLORIDE BLD-SCNC: 101 MMOL/L (ref 98–111)
CHLORIDE BLD-SCNC: 101 MMOL/L (ref 98–111)
CHLORIDE BLD-SCNC: 103 MMOL/L (ref 98–111)
CHLORIDE BLD-SCNC: 104 MMOL/L (ref 98–111)
CHLORIDE BLD-SCNC: 105 MMOL/L (ref 98–111)
CHLORIDE BLD-SCNC: 106 MMOL/L (ref 98–111)
CHOLESTEROL, TOTAL: 113 MG/DL (ref 160–199)
CHOLESTEROL, TOTAL: 123 MG/DL (ref 160–199)
CHOLESTEROL, TOTAL: 133 MG/DL (ref 160–199)
CHOLESTEROL, TOTAL: 138 MG/DL (ref 160–199)
CLARITY: ABNORMAL
CO2: 19 MMOL/L (ref 22–29)
CO2: 21 MMOL/L (ref 22–29)
CO2: 23 MMOL/L (ref 22–29)
CO2: 24 MMOL/L (ref 22–29)
CO2: 24 MMOL/L (ref 22–29)
CO2: 25 MMOL/L (ref 22–29)
CO2: 26 MMOL/L (ref 22–29)
CO2: 26 MMOL/L (ref 22–29)
COLOR: YELLOW
CREAT SERPL-MCNC: 0.5 MG/DL (ref 0.5–1.2)
CREAT SERPL-MCNC: 0.6 MG/DL (ref 0.5–1.2)
CREAT SERPL-MCNC: 0.7 MG/DL (ref 0.5–1.2)
CREAT SERPL-MCNC: 0.7 MG/DL (ref 0.5–1.2)
CREAT SERPL-MCNC: 0.8 MG/DL (ref 0.5–1.2)
CREATININE URINE: 125.3 MG/DL (ref 4.2–622)
EKG P AXIS: NORMAL DEGREES
EKG P-R INTERVAL: NORMAL MS
EKG Q-T INTERVAL: 490 MS
EKG Q-T INTERVAL: 494 MS
EKG Q-T INTERVAL: 500 MS
EKG Q-T INTERVAL: 502 MS
EKG Q-T INTERVAL: 550 MS
EKG QRS DURATION: 156 MS
EKG QRS DURATION: 74 MS
EKG QRS DURATION: 82 MS
EKG QRS DURATION: 82 MS
EKG QRS DURATION: 84 MS
EKG QTC CALCULATION (BAZETT): 480 MS
EKG QTC CALCULATION (BAZETT): 490 MS
EKG QTC CALCULATION (BAZETT): 495 MS
EKG QTC CALCULATION (BAZETT): 497 MS
EKG QTC CALCULATION (BAZETT): 531 MS
EKG T AXIS: 34 DEGREES
EKG T AXIS: 34 DEGREES
EKG T AXIS: 43 DEGREES
EKG T AXIS: 45 DEGREES
EKG T AXIS: 48 DEGREES
EOSINOPHILS ABSOLUTE: 0.1 K/UL (ref 0–0.6)
EOSINOPHILS RELATIVE PERCENT: 1.5 % (ref 0–5)
EOSINOPHILS RELATIVE PERCENT: 1.7 % (ref 0–5)
EOSINOPHILS RELATIVE PERCENT: 2 % (ref 0–5)
GFR NON-AFRICAN AMERICAN: >60
GLUCOSE BLD-MCNC: 105 MG/DL (ref 74–109)
GLUCOSE BLD-MCNC: 134 MG/DL (ref 74–109)
GLUCOSE BLD-MCNC: 136 MG/DL (ref 74–109)
GLUCOSE BLD-MCNC: 143 MG/DL (ref 74–109)
GLUCOSE BLD-MCNC: 166 MG/DL (ref 74–109)
GLUCOSE BLD-MCNC: 169 MG/DL (ref 74–109)
GLUCOSE BLD-MCNC: 90 MG/DL (ref 74–109)
GLUCOSE BLD-MCNC: 94 MG/DL (ref 74–109)
GLUCOSE URINE: NEGATIVE MG/DL
HCT VFR BLD CALC: 35.1 % (ref 42–52)
HCT VFR BLD CALC: 36.1 % (ref 42–52)
HCT VFR BLD CALC: 38.1 % (ref 42–52)
HCT VFR BLD CALC: 42.3 % (ref 42–52)
HCT VFR BLD CALC: 44.1 % (ref 42–52)
HCT VFR BLD CALC: 44.5 % (ref 42–52)
HCT VFR BLD CALC: 45.4 % (ref 42–52)
HDLC SERPL-MCNC: 43 MG/DL (ref 55–121)
HDLC SERPL-MCNC: 43 MG/DL (ref 55–121)
HDLC SERPL-MCNC: 45 MG/DL (ref 55–121)
HDLC SERPL-MCNC: 55 MG/DL (ref 55–121)
HEMOGLOBIN: 11.3 G/DL (ref 14–18)
HEMOGLOBIN: 11.5 G/DL (ref 14–18)
HEMOGLOBIN: 11.5 G/DL (ref 14–18)
HEMOGLOBIN: 14 G/DL (ref 14–18)
HEMOGLOBIN: 14.1 G/DL (ref 14–18)
HEMOGLOBIN: 14.5 G/DL (ref 14–18)
HEMOGLOBIN: 14.9 G/DL (ref 14–18)
IMMATURE GRANULOCYTES #: 0 K/UL
INR BLD: 1
INR BLD: 1.25 (ref 0.88–1.18)
INR BLD: 1.5
INR BLD: 1.6
INR BLD: 1.79 (ref 0.88–1.18)
INR BLD: 2.21 (ref 0.88–1.18)
INR BLD: 2.3
INR BLD: 2.35 (ref 0.88–1.18)
INR BLD: 2.51 (ref 0.88–1.18)
INR BLD: 2.61 (ref 0.88–1.18)
KETONES, URINE: NEGATIVE MG/DL
LDL CHOLESTEROL CALCULATED: 44 MG/DL
LDL CHOLESTEROL CALCULATED: 64 MG/DL
LDL CHOLESTEROL CALCULATED: 67 MG/DL
LDL CHOLESTEROL CALCULATED: 68 MG/DL
LEUKOCYTE ESTERASE, URINE: NEGATIVE
LV EF: 58 %
LVEF MODALITY: NORMAL
LYMPHOCYTES ABSOLUTE: 1.1 K/UL (ref 1.1–4.5)
LYMPHOCYTES ABSOLUTE: 1.1 K/UL (ref 1.1–4.5)
LYMPHOCYTES ABSOLUTE: 1.7 K/UL (ref 1.1–4.5)
LYMPHOCYTES RELATIVE PERCENT: 17.1 % (ref 20–40)
LYMPHOCYTES RELATIVE PERCENT: 17.9 % (ref 20–40)
LYMPHOCYTES RELATIVE PERCENT: 20.2 % (ref 20–40)
MAGNESIUM: 1.8 MG/DL (ref 1.6–2.4)
MAGNESIUM: 2 MG/DL (ref 1.6–2.4)
MCH RBC QN AUTO: 28.3 PG (ref 27–31)
MCH RBC QN AUTO: 28.7 PG (ref 27–31)
MCH RBC QN AUTO: 29.1 PG (ref 27–31)
MCH RBC QN AUTO: 29.3 PG (ref 27–31)
MCH RBC QN AUTO: 29.4 PG (ref 27–31)
MCH RBC QN AUTO: 29.5 PG (ref 27–31)
MCH RBC QN AUTO: 29.9 PG (ref 27–31)
MCHC RBC AUTO-ENTMCNC: 29.7 G/DL (ref 33–37)
MCHC RBC AUTO-ENTMCNC: 31.1 G/DL (ref 33–37)
MCHC RBC AUTO-ENTMCNC: 31.9 G/DL (ref 33–37)
MCHC RBC AUTO-ENTMCNC: 32.8 G/DL (ref 33–37)
MCHC RBC AUTO-ENTMCNC: 32.9 G/DL (ref 33–37)
MCHC RBC AUTO-ENTMCNC: 33.1 G/DL (ref 33–37)
MCHC RBC AUTO-ENTMCNC: 33.5 G/DL (ref 33–37)
MCV RBC AUTO: 87.2 FL (ref 80–94)
MCV RBC AUTO: 87.4 FL (ref 80–94)
MCV RBC AUTO: 89.1 FL (ref 80–94)
MCV RBC AUTO: 91 FL (ref 80–94)
MCV RBC AUTO: 91.2 FL (ref 80–94)
MCV RBC AUTO: 91.4 FL (ref 80–94)
MCV RBC AUTO: 99.2 FL (ref 80–94)
MICROALBUMIN UR-MCNC: <1.2 MG/DL (ref 0–19)
MICROALBUMIN/CREAT UR-RTO: NORMAL MG/G
MONOCYTES ABSOLUTE: 0.5 K/UL (ref 0–0.9)
MONOCYTES ABSOLUTE: 0.6 K/UL (ref 0–0.9)
MONOCYTES ABSOLUTE: 0.9 K/UL (ref 0–0.9)
MONOCYTES RELATIVE PERCENT: 10 % (ref 0–10)
MONOCYTES RELATIVE PERCENT: 10.2 % (ref 0–10)
MONOCYTES RELATIVE PERCENT: 8.4 % (ref 0–10)
NEUTROPHILS ABSOLUTE: 4.2 K/UL (ref 1.5–7.5)
NEUTROPHILS ABSOLUTE: 4.3 K/UL (ref 1.5–7.5)
NEUTROPHILS ABSOLUTE: 5.5 K/UL (ref 1.5–7.5)
NEUTROPHILS RELATIVE PERCENT: 66.8 % (ref 50–65)
NEUTROPHILS RELATIVE PERCENT: 70.2 % (ref 50–65)
NEUTROPHILS RELATIVE PERCENT: 71.1 % (ref 50–65)
NITRITE, URINE: NEGATIVE
PDW BLD-RTO: 13.5 % (ref 11.5–14.5)
PDW BLD-RTO: 13.6 % (ref 11.5–14.5)
PDW BLD-RTO: 13.6 % (ref 11.5–14.5)
PDW BLD-RTO: 13.8 % (ref 11.5–14.5)
PDW BLD-RTO: 14.2 % (ref 11.5–14.5)
PDW BLD-RTO: 14.3 % (ref 11.5–14.5)
PDW BLD-RTO: 15.1 % (ref 11.5–14.5)
PH UA: 5.5 (ref 5–8)
PLATELET # BLD: 162 K/UL (ref 130–400)
PLATELET # BLD: 182 K/UL (ref 130–400)
PLATELET # BLD: 186 K/UL (ref 130–400)
PLATELET # BLD: 205 K/UL (ref 130–400)
PLATELET # BLD: 227 K/UL (ref 130–400)
PLATELET # BLD: 241 K/UL (ref 130–400)
PLATELET # BLD: 265 K/UL (ref 130–400)
PMV BLD AUTO: 9 FL (ref 9.4–12.4)
PMV BLD AUTO: 9.1 FL (ref 9.4–12.4)
PMV BLD AUTO: 9.2 FL (ref 9.4–12.4)
PMV BLD AUTO: 9.3 FL (ref 9.4–12.4)
PMV BLD AUTO: 9.4 FL (ref 9.4–12.4)
PMV BLD AUTO: 9.5 FL (ref 9.4–12.4)
PMV BLD AUTO: 9.7 FL (ref 9.4–12.4)
POTASSIUM REFLEX MAGNESIUM: 3.3 MMOL/L (ref 3.5–5)
POTASSIUM REFLEX MAGNESIUM: 3.4 MMOL/L (ref 3.5–5)
POTASSIUM REFLEX MAGNESIUM: 4 MMOL/L (ref 3.5–5)
POTASSIUM REFLEX MAGNESIUM: 4.2 MMOL/L (ref 3.5–5)
POTASSIUM REFLEX MAGNESIUM: 4.6 MMOL/L (ref 3.5–5)
POTASSIUM SERPL-SCNC: 3.1 MMOL/L (ref 3.5–5)
POTASSIUM SERPL-SCNC: 4 MMOL/L (ref 3.5–5)
POTASSIUM SERPL-SCNC: 4.2 MMOL/L (ref 3.5–5)
PRO-BNP: 492 PG/ML (ref 0–1800)
PROSTATE SPECIFIC ANTIGEN: 0.12 NG/ML (ref 0–4)
PROTEIN UA: NEGATIVE MG/DL
PROTHROMBIN TIME: 15.7 SEC (ref 12–14.6)
PROTHROMBIN TIME: 21 SEC (ref 12–14.6)
PROTHROMBIN TIME: 25 SEC (ref 12–14.6)
PROTHROMBIN TIME: 26.2 SEC (ref 12–14.6)
PROTHROMBIN TIME: 27.7 SEC (ref 12–14.6)
PROTHROMBIN TIME: 28.6 SEC (ref 12–14.6)
PROTIME: 10.1 SECONDS
PROTIME: 15 SECONDS
PROTIME: 16.5 SECONDS
PROTIME: 23.1 SECONDS
RBC # BLD: 3.84 M/UL (ref 4.7–6.1)
RBC # BLD: 3.85 M/UL (ref 4.7–6.1)
RBC # BLD: 3.95 M/UL (ref 4.7–6.1)
RBC # BLD: 4.75 M/UL (ref 4.7–6.1)
RBC # BLD: 4.99 M/UL (ref 4.7–6.1)
RBC # BLD: 5.06 M/UL (ref 4.7–6.1)
RBC # BLD: 5.09 M/UL (ref 4.7–6.1)
SODIUM BLD-SCNC: 135 MMOL/L (ref 136–145)
SODIUM BLD-SCNC: 140 MMOL/L (ref 136–145)
SODIUM BLD-SCNC: 141 MMOL/L (ref 136–145)
SODIUM BLD-SCNC: 142 MMOL/L (ref 136–145)
SPECIFIC GRAVITY UA: 1.02 (ref 1–1.03)
TOTAL PROTEIN: 5.4 G/DL (ref 6.6–8.7)
TOTAL PROTEIN: 6.2 G/DL (ref 6.6–8.7)
TOTAL PROTEIN: 6.5 G/DL (ref 6.6–8.7)
TOTAL PROTEIN: 6.6 G/DL (ref 6.6–8.7)
TOTAL PROTEIN: 7.2 G/DL (ref 6.6–8.7)
TRIGL SERPL-MCNC: 128 MG/DL (ref 0–149)
TRIGL SERPL-MCNC: 67 MG/DL (ref 0–149)
TRIGL SERPL-MCNC: 93 MG/DL (ref 0–149)
TRIGL SERPL-MCNC: 98 MG/DL (ref 0–149)
TROPONIN: 0.02 NG/ML (ref 0–0.03)
TROPONIN: 0.07 NG/ML (ref 0–0.03)
TROPONIN: <0.01 NG/ML (ref 0–0.03)
TSH SERPL DL<=0.05 MIU/L-ACNC: 4.7 UIU/ML (ref 0.27–4.2)
URINE CULTURE, ROUTINE: NORMAL
UROBILINOGEN, URINE: 0.2 E.U./DL
WBC # BLD: 5.6 K/UL (ref 4.8–10.8)
WBC # BLD: 6 K/UL (ref 4.8–10.8)
WBC # BLD: 6.1 K/UL (ref 4.8–10.8)
WBC # BLD: 6.1 K/UL (ref 4.8–10.8)
WBC # BLD: 6.2 K/UL (ref 4.8–10.8)
WBC # BLD: 8.3 K/UL (ref 4.8–10.8)
WBC # BLD: 8.3 K/UL (ref 4.8–10.8)

## 2020-01-01 PROCEDURE — 36415 COLL VENOUS BLD VENIPUNCTURE: CPT

## 2020-01-01 PROCEDURE — 2140000000 HC CCU INTERMEDIATE R&B

## 2020-01-01 PROCEDURE — 1123F ACP DISCUSS/DSCN MKR DOCD: CPT | Performed by: PEDIATRICS

## 2020-01-01 PROCEDURE — 80061 LIPID PANEL: CPT

## 2020-01-01 PROCEDURE — 92978 ENDOLUMINL IVUS OCT C 1ST: CPT | Performed by: INTERNAL MEDICINE

## 2020-01-01 PROCEDURE — G8427 DOCREV CUR MEDS BY ELIG CLIN: HCPCS | Performed by: INTERNAL MEDICINE

## 2020-01-01 PROCEDURE — 99213 OFFICE O/P EST LOW 20 MIN: CPT | Performed by: INTERNAL MEDICINE

## 2020-01-01 PROCEDURE — 2580000003 HC RX 258: Performed by: INTERNAL MEDICINE

## 2020-01-01 PROCEDURE — 92928 PRQ TCAT PLMT NTRAC ST 1 LES: CPT

## 2020-01-01 PROCEDURE — 93005 ELECTROCARDIOGRAM TRACING: CPT | Performed by: INTERNAL MEDICINE

## 2020-01-01 PROCEDURE — 1036F TOBACCO NON-USER: CPT | Performed by: PEDIATRICS

## 2020-01-01 PROCEDURE — 93010 ELECTROCARDIOGRAM REPORT: CPT | Performed by: INTERNAL MEDICINE

## 2020-01-01 PROCEDURE — 84484 ASSAY OF TROPONIN QUANT: CPT

## 2020-01-01 PROCEDURE — C1887 CATHETER, GUIDING: HCPCS

## 2020-01-01 PROCEDURE — 71045 X-RAY EXAM CHEST 1 VIEW: CPT

## 2020-01-01 PROCEDURE — 93350 STRESS TTE ONLY: CPT

## 2020-01-01 PROCEDURE — 4040F PNEUMOC VAC/ADMIN/RCVD: CPT | Performed by: PEDIATRICS

## 2020-01-01 PROCEDURE — 6370000000 HC RX 637 (ALT 250 FOR IP)

## 2020-01-01 PROCEDURE — 0296T PR EXT ECG > 48HR TO 21 DAY RCRD W/CONECT INTL RCRD: CPT | Performed by: NURSE PRACTITIONER

## 2020-01-01 PROCEDURE — 6370000000 HC RX 637 (ALT 250 FOR IP): Performed by: INTERNAL MEDICINE

## 2020-01-01 PROCEDURE — 99152 MOD SED SAME PHYS/QHP 5/>YRS: CPT | Performed by: INTERNAL MEDICINE

## 2020-01-01 PROCEDURE — 2500000003 HC RX 250 WO HCPCS

## 2020-01-01 PROCEDURE — G0378 HOSPITAL OBSERVATION PER HR: HCPCS

## 2020-01-01 PROCEDURE — G8417 CALC BMI ABV UP PARAM F/U: HCPCS | Performed by: PEDIATRICS

## 2020-01-01 PROCEDURE — 93971 EXTREMITY STUDY: CPT

## 2020-01-01 PROCEDURE — 6360000002 HC RX W HCPCS: Performed by: NURSE PRACTITIONER

## 2020-01-01 PROCEDURE — G0439 PPPS, SUBSEQ VISIT: HCPCS | Performed by: PEDIATRICS

## 2020-01-01 PROCEDURE — 93000 ELECTROCARDIOGRAM COMPLETE: CPT | Performed by: NURSE PRACTITIONER

## 2020-01-01 PROCEDURE — B2111ZZ FLUOROSCOPY OF MULTIPLE CORONARY ARTERIES USING LOW OSMOLAR CONTRAST: ICD-10-PCS | Performed by: INTERNAL MEDICINE

## 2020-01-01 PROCEDURE — 93280 PM DEVICE PROGR EVAL DUAL: CPT | Performed by: INTERNAL MEDICINE

## 2020-01-01 PROCEDURE — 4040F PNEUMOC VAC/ADMIN/RCVD: CPT | Performed by: INTERNAL MEDICINE

## 2020-01-01 PROCEDURE — 85027 COMPLETE CBC AUTOMATED: CPT

## 2020-01-01 PROCEDURE — G8417 CALC BMI ABV UP PARAM F/U: HCPCS | Performed by: NURSE PRACTITIONER

## 2020-01-01 PROCEDURE — G8427 DOCREV CUR MEDS BY ELIG CLIN: HCPCS | Performed by: PEDIATRICS

## 2020-01-01 PROCEDURE — 80053 COMPREHEN METABOLIC PANEL: CPT

## 2020-01-01 PROCEDURE — 1123F ACP DISCUSS/DSCN MKR DOCD: CPT | Performed by: INTERNAL MEDICINE

## 2020-01-01 PROCEDURE — 83735 ASSAY OF MAGNESIUM: CPT

## 2020-01-01 PROCEDURE — 99232 SBSQ HOSP IP/OBS MODERATE 35: CPT | Performed by: INTERNAL MEDICINE

## 2020-01-01 PROCEDURE — 6360000002 HC RX W HCPCS

## 2020-01-01 PROCEDURE — 99214 OFFICE O/P EST MOD 30 MIN: CPT | Performed by: NURSE PRACTITIONER

## 2020-01-01 PROCEDURE — C1769 GUIDE WIRE: HCPCS

## 2020-01-01 PROCEDURE — C1894 INTRO/SHEATH, NON-LASER: HCPCS

## 2020-01-01 PROCEDURE — 99024 POSTOP FOLLOW-UP VISIT: CPT | Performed by: INTERNAL MEDICINE

## 2020-01-01 PROCEDURE — 80048 BASIC METABOLIC PNL TOTAL CA: CPT

## 2020-01-01 PROCEDURE — 1036F TOBACCO NON-USER: CPT | Performed by: INTERNAL MEDICINE

## 2020-01-01 PROCEDURE — G8417 CALC BMI ABV UP PARAM F/U: HCPCS | Performed by: INTERNAL MEDICINE

## 2020-01-01 PROCEDURE — 85730 THROMBOPLASTIN TIME PARTIAL: CPT

## 2020-01-01 PROCEDURE — 1036F TOBACCO NON-USER: CPT | Performed by: NURSE PRACTITIONER

## 2020-01-01 PROCEDURE — C1874 STENT, COATED/COV W/DEL SYS: HCPCS

## 2020-01-01 PROCEDURE — G8427 DOCREV CUR MEDS BY ELIG CLIN: HCPCS | Performed by: NURSE PRACTITIONER

## 2020-01-01 PROCEDURE — 6360000004 HC RX CONTRAST MEDICATION: Performed by: INTERNAL MEDICINE

## 2020-01-01 PROCEDURE — 99285 EMERGENCY DEPT VISIT HI MDM: CPT

## 2020-01-01 PROCEDURE — 2709999900 HC NON-CHARGEABLE SUPPLY

## 2020-01-01 PROCEDURE — 0JH606Z INSERTION OF PACEMAKER, DUAL CHAMBER INTO CHEST SUBCUTANEOUS TISSUE AND FASCIA, OPEN APPROACH: ICD-10-PCS | Performed by: INTERNAL MEDICINE

## 2020-01-01 PROCEDURE — 4040F PNEUMOC VAC/ADMIN/RCVD: CPT | Performed by: NURSE PRACTITIONER

## 2020-01-01 PROCEDURE — 81003 URINALYSIS AUTO W/O SCOPE: CPT

## 2020-01-01 PROCEDURE — 85610 PROTHROMBIN TIME: CPT

## 2020-01-01 PROCEDURE — 6360000002 HC RX W HCPCS: Performed by: INTERNAL MEDICINE

## 2020-01-01 PROCEDURE — 99152 MOD SED SAME PHYS/QHP 5/>YRS: CPT

## 2020-01-01 PROCEDURE — 3E0132A INTRODUCTION OF ANTI-INFECTIVE ENVELOPE INTO SUBCUTANEOUS TISSUE, PERCUTANEOUS APPROACH: ICD-10-PCS | Performed by: INTERNAL MEDICINE

## 2020-01-01 PROCEDURE — 1123F ACP DISCUSS/DSCN MKR DOCD: CPT | Performed by: NURSE PRACTITIONER

## 2020-01-01 PROCEDURE — 71046 X-RAY EXAM CHEST 2 VIEWS: CPT

## 2020-01-01 PROCEDURE — 33208 INSRT HEART PM ATRIAL & VENT: CPT

## 2020-01-01 PROCEDURE — 76770 US EXAM ABDO BACK WALL COMP: CPT

## 2020-01-01 PROCEDURE — 92928 PRQ TCAT PLMT NTRAC ST 1 LES: CPT | Performed by: INTERNAL MEDICINE

## 2020-01-01 PROCEDURE — 99213 OFFICE O/P EST LOW 20 MIN: CPT | Performed by: PEDIATRICS

## 2020-01-01 PROCEDURE — C1725 CATH, TRANSLUMIN NON-LASER: HCPCS

## 2020-01-01 PROCEDURE — G8484 FLU IMMUNIZE NO ADMIN: HCPCS | Performed by: NURSE PRACTITIONER

## 2020-01-01 PROCEDURE — 33208 INSRT HEART PM ATRIAL & VENT: CPT | Performed by: INTERNAL MEDICINE

## 2020-01-01 PROCEDURE — C1753 CATH, INTRAVAS ULTRASOUND: HCPCS

## 2020-01-01 PROCEDURE — 2780000010 HC IMPLANT OTHER

## 2020-01-01 PROCEDURE — 85025 COMPLETE CBC W/AUTO DIFF WBC: CPT

## 2020-01-01 PROCEDURE — 93306 TTE W/DOPPLER COMPLETE: CPT

## 2020-01-01 PROCEDURE — 99024 POSTOP FOLLOW-UP VISIT: CPT | Performed by: CLINICAL NURSE SPECIALIST

## 2020-01-01 PROCEDURE — 02HK3JZ INSERTION OF PACEMAKER LEAD INTO RIGHT VENTRICLE, PERCUTANEOUS APPROACH: ICD-10-PCS | Performed by: INTERNAL MEDICINE

## 2020-01-01 PROCEDURE — 99443 PR PHYS/QHP TELEPHONE EVALUATION 21-30 MIN: CPT | Performed by: NURSE PRACTITIONER

## 2020-01-01 PROCEDURE — 93458 L HRT ARTERY/VENTRICLE ANGIO: CPT | Performed by: INTERNAL MEDICINE

## 2020-01-01 PROCEDURE — 99999 PR OFFICE/OUTPT VISIT,PROCEDURE ONLY: CPT | Performed by: EMERGENCY MEDICINE

## 2020-01-01 PROCEDURE — 99153 MOD SED SAME PHYS/QHP EA: CPT

## 2020-01-01 PROCEDURE — 93005 ELECTROCARDIOGRAM TRACING: CPT | Performed by: EMERGENCY MEDICINE

## 2020-01-01 PROCEDURE — 92978 ENDOLUMINL IVUS OCT C 1ST: CPT

## 2020-01-01 PROCEDURE — 02H63JZ INSERTION OF PACEMAKER LEAD INTO RIGHT ATRIUM, PERCUTANEOUS APPROACH: ICD-10-PCS | Performed by: INTERNAL MEDICINE

## 2020-01-01 PROCEDURE — 027036Z DILATION OF CORONARY ARTERY, ONE ARTERY WITH THREE DRUG-ELUTING INTRALUMINAL DEVICES, PERCUTANEOUS APPROACH: ICD-10-PCS | Performed by: INTERNAL MEDICINE

## 2020-01-01 PROCEDURE — C1898 LEAD, PMKR, OTHER THAN TRANS: HCPCS

## 2020-01-01 PROCEDURE — 027034Z DILATION OF CORONARY ARTERY, ONE ARTERY WITH DRUG-ELUTING INTRALUMINAL DEVICE, PERCUTANEOUS APPROACH: ICD-10-PCS | Performed by: INTERNAL MEDICINE

## 2020-01-01 PROCEDURE — 99223 1ST HOSP IP/OBS HIGH 75: CPT | Performed by: INTERNAL MEDICINE

## 2020-01-01 PROCEDURE — 93458 L HRT ARTERY/VENTRICLE ANGIO: CPT

## 2020-01-01 PROCEDURE — C1760 CLOSURE DEV, VASC: HCPCS

## 2020-01-01 PROCEDURE — 99214 OFFICE O/P EST MOD 30 MIN: CPT | Performed by: PEDIATRICS

## 2020-01-01 PROCEDURE — 4A023N7 MEASUREMENT OF CARDIAC SAMPLING AND PRESSURE, LEFT HEART, PERCUTANEOUS APPROACH: ICD-10-PCS | Performed by: INTERNAL MEDICINE

## 2020-01-01 PROCEDURE — 83880 ASSAY OF NATRIURETIC PEPTIDE: CPT

## 2020-01-01 PROCEDURE — C1785 PMKR, DUAL, RATE-RESP: HCPCS

## 2020-01-01 PROCEDURE — B2151ZZ FLUOROSCOPY OF LEFT HEART USING LOW OSMOLAR CONTRAST: ICD-10-PCS | Performed by: INTERNAL MEDICINE

## 2020-01-01 RX ORDER — SODIUM CHLORIDE 0.9 % (FLUSH) 0.9 %
10 SYRINGE (ML) INJECTION PRN
Status: DISCONTINUED | OUTPATIENT
Start: 2020-01-01 | End: 2020-01-01 | Stop reason: HOSPADM

## 2020-01-01 RX ORDER — HYDRALAZINE HYDROCHLORIDE 20 MG/ML
10 INJECTION INTRAMUSCULAR; INTRAVENOUS EVERY 10 MIN PRN
Status: DISCONTINUED | OUTPATIENT
Start: 2020-01-01 | End: 2020-01-01 | Stop reason: SDUPTHER

## 2020-01-01 RX ORDER — SODIUM CHLORIDE 0.9 % (FLUSH) 0.9 %
10 SYRINGE (ML) INJECTION PRN
Status: DISCONTINUED | OUTPATIENT
Start: 2020-01-01 | End: 2020-01-01 | Stop reason: SDUPTHER

## 2020-01-01 RX ORDER — SODIUM CHLORIDE 9 MG/ML
INJECTION, SOLUTION INTRAVENOUS CONTINUOUS
Status: DISCONTINUED | OUTPATIENT
Start: 2020-01-01 | End: 2020-01-01 | Stop reason: SDUPTHER

## 2020-01-01 RX ORDER — ATORVASTATIN CALCIUM 40 MG/1
80 TABLET, FILM COATED ORAL NIGHTLY
Status: DISCONTINUED | OUTPATIENT
Start: 2020-01-01 | End: 2020-01-01 | Stop reason: HOSPADM

## 2020-01-01 RX ORDER — SODIUM CHLORIDE 0.9 % (FLUSH) 0.9 %
10 SYRINGE (ML) INJECTION EVERY 12 HOURS SCHEDULED
Status: DISCONTINUED | OUTPATIENT
Start: 2020-01-01 | End: 2020-01-01 | Stop reason: SDUPTHER

## 2020-01-01 RX ORDER — WARFARIN SODIUM 4 MG/1
4 TABLET ORAL 2 TIMES DAILY
COMMUNITY

## 2020-01-01 RX ORDER — FLUTICASONE PROPIONATE 50 MCG
2 SPRAY, SUSPENSION (ML) NASAL DAILY
Qty: 48 G | Refills: 5 | Status: SHIPPED | OUTPATIENT
Start: 2020-01-01

## 2020-01-01 RX ORDER — NITROGLYCERIN 0.4 MG/1
0.4 TABLET SUBLINGUAL EVERY 5 MIN PRN
Status: DISCONTINUED | OUTPATIENT
Start: 2020-01-01 | End: 2020-01-01 | Stop reason: ALTCHOICE

## 2020-01-01 RX ORDER — ALPRAZOLAM 0.5 MG/1
0.5 TABLET ORAL
Status: ACTIVE | OUTPATIENT
Start: 2020-01-01 | End: 2020-01-01

## 2020-01-01 RX ORDER — WARFARIN SODIUM 4 MG/1
8 TABLET ORAL DAILY
Qty: 60 TABLET | Refills: 3 | Status: SHIPPED | OUTPATIENT
Start: 2020-01-01 | End: 2020-01-01

## 2020-01-01 RX ORDER — WARFARIN SODIUM 4 MG/1
8 TABLET ORAL DAILY
Qty: 30 TABLET | Refills: 0 | Status: SHIPPED | OUTPATIENT
Start: 2020-01-01 | End: 2020-01-01

## 2020-01-01 RX ORDER — SODIUM CHLORIDE 9 MG/ML
INJECTION, SOLUTION INTRAVENOUS
Status: COMPLETED | OUTPATIENT
Start: 2020-01-01 | End: 2020-01-01

## 2020-01-01 RX ORDER — SODIUM CHLORIDE 9 MG/ML
500 INJECTION, SOLUTION INTRAVENOUS CONTINUOUS PRN
Status: DISCONTINUED | OUTPATIENT
Start: 2020-01-01 | End: 2020-01-01 | Stop reason: ALTCHOICE

## 2020-01-01 RX ORDER — CLOPIDOGREL BISULFATE 75 MG/1
75 TABLET ORAL DAILY
Status: DISCONTINUED | OUTPATIENT
Start: 2020-01-01 | End: 2020-01-01 | Stop reason: HOSPADM

## 2020-01-01 RX ORDER — SODIUM CHLORIDE 9 MG/ML
1000 INJECTION, SOLUTION INTRAVENOUS CONTINUOUS
Status: DISCONTINUED | OUTPATIENT
Start: 2020-01-01 | End: 2020-01-01 | Stop reason: SDUPTHER

## 2020-01-01 RX ORDER — SODIUM CHLORIDE 9 MG/ML
INJECTION, SOLUTION INTRAVENOUS CONTINUOUS
Status: DISCONTINUED | OUTPATIENT
Start: 2020-01-01 | End: 2020-01-01 | Stop reason: HOSPADM

## 2020-01-01 RX ORDER — METOPROLOL TARTRATE 5 MG/5ML
5 INJECTION INTRAVENOUS EVERY 5 MIN PRN
Status: DISCONTINUED | OUTPATIENT
Start: 2020-01-01 | End: 2020-01-01 | Stop reason: ALTCHOICE

## 2020-01-01 RX ORDER — ATORVASTATIN CALCIUM 80 MG/1
80 TABLET, FILM COATED ORAL NIGHTLY
Qty: 30 TABLET | Refills: 3 | Status: SHIPPED | OUTPATIENT
Start: 2020-01-01 | End: 2020-01-01

## 2020-01-01 RX ORDER — FLUTICASONE PROPIONATE 50 MCG
2 SPRAY, SUSPENSION (ML) NASAL DAILY
Status: DISCONTINUED | OUTPATIENT
Start: 2020-01-01 | End: 2020-01-01 | Stop reason: HOSPADM

## 2020-01-01 RX ORDER — ACETAMINOPHEN 325 MG/1
650 TABLET ORAL EVERY 4 HOURS PRN
Status: DISCONTINUED | OUTPATIENT
Start: 2020-01-01 | End: 2020-01-01 | Stop reason: HOSPADM

## 2020-01-01 RX ORDER — ASPIRIN 81 MG/1
81 TABLET ORAL DAILY
Status: DISCONTINUED | OUTPATIENT
Start: 2020-01-01 | End: 2020-01-01 | Stop reason: HOSPADM

## 2020-01-01 RX ORDER — NITROGLYCERIN 0.4 MG/1
0.4 TABLET SUBLINGUAL EVERY 5 MIN PRN
Status: CANCELLED | OUTPATIENT
Start: 2020-01-01 | End: 2020-01-01

## 2020-01-01 RX ORDER — ONDANSETRON 2 MG/ML
4 INJECTION INTRAMUSCULAR; INTRAVENOUS EVERY 6 HOURS PRN
Status: DISCONTINUED | OUTPATIENT
Start: 2020-01-01 | End: 2020-01-01 | Stop reason: SDUPTHER

## 2020-01-01 RX ORDER — FINASTERIDE 5 MG/1
5 TABLET, FILM COATED ORAL DAILY
Qty: 90 TABLET | Refills: 3 | Status: SHIPPED | OUTPATIENT
Start: 2020-01-01

## 2020-01-01 RX ORDER — DOBUTAMINE HYDROCHLORIDE 200 MG/100ML
10 INJECTION INTRAVENOUS CONTINUOUS
Status: DISCONTINUED | OUTPATIENT
Start: 2020-01-01 | End: 2020-01-01 | Stop reason: ALTCHOICE

## 2020-01-01 RX ORDER — CLOPIDOGREL BISULFATE 75 MG/1
75 TABLET ORAL DAILY
Status: DISCONTINUED | OUTPATIENT
Start: 2020-01-01 | End: 2020-01-01 | Stop reason: SDUPTHER

## 2020-01-01 RX ORDER — METOPROLOL TARTRATE 5 MG/5ML
5 INJECTION INTRAVENOUS EVERY 5 MIN PRN
Status: CANCELLED | OUTPATIENT
Start: 2020-01-01 | End: 2020-01-01

## 2020-01-01 RX ORDER — SODIUM CHLORIDE 0.9 % (FLUSH) 0.9 %
10 SYRINGE (ML) INJECTION EVERY 12 HOURS SCHEDULED
Status: DISCONTINUED | OUTPATIENT
Start: 2020-01-01 | End: 2020-01-01 | Stop reason: HOSPADM

## 2020-01-01 RX ORDER — WARFARIN SODIUM 4 MG/1
8 TABLET ORAL DAILY
Qty: 60 TABLET | Refills: 3 | Status: SHIPPED | OUTPATIENT
Start: 2020-01-01

## 2020-01-01 RX ORDER — CHLORHEXIDINE GLUCONATE 4 G/100ML
SOLUTION TOPICAL ONCE
Status: COMPLETED | OUTPATIENT
Start: 2020-01-01 | End: 2020-01-01

## 2020-01-01 RX ORDER — ASPIRIN 81 MG/1
81 TABLET ORAL ONCE
Status: DISCONTINUED | OUTPATIENT
Start: 2020-01-01 | End: 2020-01-01 | Stop reason: SDUPTHER

## 2020-01-01 RX ORDER — LISINOPRIL 10 MG/1
10 TABLET ORAL DAILY
Status: DISCONTINUED | OUTPATIENT
Start: 2020-01-01 | End: 2020-01-01 | Stop reason: HOSPADM

## 2020-01-01 RX ORDER — ATORVASTATIN CALCIUM 40 MG/1
80 TABLET, FILM COATED ORAL NIGHTLY
Status: DISCONTINUED | OUTPATIENT
Start: 2020-01-01 | End: 2020-01-01 | Stop reason: ALTCHOICE

## 2020-01-01 RX ORDER — WARFARIN SODIUM 5 MG/1
TABLET ORAL
Qty: 135 TABLET | Refills: 0 | Status: ON HOLD
Start: 2020-01-01 | End: 2020-01-01 | Stop reason: HOSPADM

## 2020-01-01 RX ORDER — ATORVASTATIN CALCIUM 80 MG/1
80 TABLET, FILM COATED ORAL NIGHTLY
Qty: 90 TABLET | Refills: 3 | Status: SHIPPED | OUTPATIENT
Start: 2020-01-01

## 2020-01-01 RX ORDER — ATORVASTATIN CALCIUM 20 MG/1
20 TABLET, FILM COATED ORAL NIGHTLY
Status: DISCONTINUED | OUTPATIENT
Start: 2020-01-01 | End: 2020-01-01 | Stop reason: SDUPTHER

## 2020-01-01 RX ORDER — ACETAMINOPHEN 325 MG/1
650 TABLET ORAL EVERY 4 HOURS PRN
Status: DISCONTINUED | OUTPATIENT
Start: 2020-01-01 | End: 2020-01-01 | Stop reason: SDUPTHER

## 2020-01-01 RX ORDER — NITROFURANTOIN 25; 75 MG/1; MG/1
100 CAPSULE ORAL 2 TIMES DAILY
Qty: 20 CAPSULE | Refills: 0 | Status: SHIPPED | OUTPATIENT
Start: 2020-01-01 | End: 2020-01-01

## 2020-01-01 RX ORDER — ALBUTEROL SULFATE 90 UG/1
2 AEROSOL, METERED RESPIRATORY (INHALATION) PRN
Status: CANCELLED | OUTPATIENT
Start: 2020-01-01 | End: 2020-01-01

## 2020-01-01 RX ORDER — SODIUM CHLORIDE 0.9 % (FLUSH) 0.9 %
10 SYRINGE (ML) INJECTION PRN
Status: CANCELLED | OUTPATIENT
Start: 2020-01-01 | End: 2020-01-01

## 2020-01-01 RX ORDER — CHLORHEXIDINE GLUCONATE 4 G/100ML
SOLUTION TOPICAL ONCE
Status: DISCONTINUED | OUTPATIENT
Start: 2020-01-01 | End: 2020-01-01 | Stop reason: HOSPADM

## 2020-01-01 RX ORDER — CITALOPRAM 40 MG/1
40 TABLET ORAL DAILY
Status: DISCONTINUED | OUTPATIENT
Start: 2020-01-01 | End: 2020-01-01 | Stop reason: HOSPADM

## 2020-01-01 RX ORDER — WARFARIN SODIUM 1 MG/1
1 TABLET ORAL DAILY
Qty: 30 TABLET | Refills: 3 | Status: SHIPPED | OUTPATIENT
Start: 2020-01-01

## 2020-01-01 RX ORDER — POTASSIUM CHLORIDE 20 MEQ/1
40 TABLET, EXTENDED RELEASE ORAL ONCE
Status: COMPLETED | OUTPATIENT
Start: 2020-01-01 | End: 2020-01-01

## 2020-01-01 RX ORDER — FINASTERIDE 5 MG/1
5 TABLET, FILM COATED ORAL DAILY
Status: DISCONTINUED | OUTPATIENT
Start: 2020-01-01 | End: 2020-01-01 | Stop reason: HOSPADM

## 2020-01-01 RX ORDER — HYDRALAZINE HYDROCHLORIDE 20 MG/ML
10 INJECTION INTRAMUSCULAR; INTRAVENOUS EVERY 10 MIN PRN
Status: DISCONTINUED | OUTPATIENT
Start: 2020-01-01 | End: 2020-01-01 | Stop reason: HOSPADM

## 2020-01-01 RX ORDER — CETIRIZINE HYDROCHLORIDE 10 MG/1
TABLET, CHEWABLE ORAL
COMMUNITY
Start: 2019-06-25 | End: 2020-01-01

## 2020-01-01 RX ORDER — ASPIRIN 81 MG/1
81 TABLET ORAL DAILY
Status: DISCONTINUED | OUTPATIENT
Start: 2020-01-01 | End: 2020-01-01 | Stop reason: SDUPTHER

## 2020-01-01 RX ORDER — SODIUM CHLORIDE 0.9 % (FLUSH) 0.9 %
10 SYRINGE (ML) INJECTION PRN
Status: DISCONTINUED | OUTPATIENT
Start: 2020-01-01 | End: 2020-01-01 | Stop reason: ALTCHOICE

## 2020-01-01 RX ORDER — CLOPIDOGREL BISULFATE 75 MG/1
75 TABLET ORAL DAILY
Qty: 90 TABLET | Refills: 3 | Status: SHIPPED | OUTPATIENT
Start: 2020-01-01

## 2020-01-01 RX ORDER — CLOPIDOGREL BISULFATE 75 MG/1
75 TABLET ORAL DAILY
Qty: 30 TABLET | Refills: 3 | Status: SHIPPED | OUTPATIENT
Start: 2020-01-01 | End: 2020-01-01 | Stop reason: SDUPTHER

## 2020-01-01 RX ORDER — ATROPINE SULFATE 0.1 MG/ML
0.5 INJECTION INTRAVENOUS EVERY 5 MIN PRN
Status: DISCONTINUED | OUTPATIENT
Start: 2020-01-01 | End: 2020-01-01 | Stop reason: ALTCHOICE

## 2020-01-01 RX ORDER — ATROPINE SULFATE 0.1 MG/ML
0.5 INJECTION INTRAVENOUS EVERY 5 MIN PRN
Status: CANCELLED | OUTPATIENT
Start: 2020-01-01 | End: 2020-01-01

## 2020-01-01 RX ORDER — ASPIRIN 81 MG/1
81 TABLET ORAL DAILY
Qty: 30 TABLET | Refills: 3 | Status: SHIPPED | OUTPATIENT
Start: 2020-01-01

## 2020-01-01 RX ORDER — ALBUTEROL SULFATE 90 UG/1
2 AEROSOL, METERED RESPIRATORY (INHALATION) PRN
Status: DISCONTINUED | OUTPATIENT
Start: 2020-01-01 | End: 2020-01-01 | Stop reason: ALTCHOICE

## 2020-01-01 RX ORDER — SODIUM CHLORIDE 9 MG/ML
500 INJECTION, SOLUTION INTRAVENOUS CONTINUOUS PRN
Status: CANCELLED | OUTPATIENT
Start: 2020-01-01 | End: 2020-01-01

## 2020-01-01 RX ORDER — ALPRAZOLAM 0.5 MG/1
0.5 TABLET ORAL
Status: DISCONTINUED | OUTPATIENT
Start: 2020-01-01 | End: 2020-01-01

## 2020-01-01 RX ORDER — ONDANSETRON 2 MG/ML
4 INJECTION INTRAMUSCULAR; INTRAVENOUS EVERY 6 HOURS PRN
Status: DISCONTINUED | OUTPATIENT
Start: 2020-01-01 | End: 2020-01-01 | Stop reason: HOSPADM

## 2020-01-01 RX ADMIN — MUPIROCIN: 20 OINTMENT TOPICAL at 22:22

## 2020-01-01 RX ADMIN — SODIUM CHLORIDE, PRESERVATIVE FREE 10 ML: 5 INJECTION INTRAVENOUS at 08:22

## 2020-01-01 RX ADMIN — LISINOPRIL 10 MG: 10 TABLET ORAL at 08:22

## 2020-01-01 RX ADMIN — CITALOPRAM HYDROBROMIDE 40 MG: 40 TABLET ORAL at 11:11

## 2020-01-01 RX ADMIN — FINASTERIDE 5 MG: 5 TABLET, FILM COATED ORAL at 15:31

## 2020-01-01 RX ADMIN — ASPIRIN 81 MG: 81 TABLET, COATED ORAL at 09:08

## 2020-01-01 RX ADMIN — ACETAMINOPHEN 650 MG: 325 TABLET ORAL at 15:33

## 2020-01-01 RX ADMIN — ASPIRIN 81 MG: 81 TABLET, COATED ORAL at 08:25

## 2020-01-01 RX ADMIN — CITALOPRAM HYDROBROMIDE 40 MG: 40 TABLET ORAL at 09:08

## 2020-01-01 RX ADMIN — DOBUTAMINE HYDROCHLORIDE 10 MCG/KG/MIN: 200 INJECTION INTRAVENOUS at 11:33

## 2020-01-01 RX ADMIN — ONDANSETRON 4 MG: 2 INJECTION INTRAMUSCULAR; INTRAVENOUS at 15:33

## 2020-01-01 RX ADMIN — SODIUM CHLORIDE, PRESERVATIVE FREE 10 ML: 5 INJECTION INTRAVENOUS at 20:44

## 2020-01-01 RX ADMIN — SODIUM CHLORIDE, PRESERVATIVE FREE 10 ML: 5 INJECTION INTRAVENOUS at 22:22

## 2020-01-01 RX ADMIN — SODIUM CHLORIDE: 9 INJECTION, SOLUTION INTRAVENOUS at 15:33

## 2020-01-01 RX ADMIN — FINASTERIDE 5 MG: 5 TABLET, FILM COATED ORAL at 08:25

## 2020-01-01 RX ADMIN — FINASTERIDE 5 MG: 5 TABLET, FILM COATED ORAL at 09:08

## 2020-01-01 RX ADMIN — SODIUM CHLORIDE: 9 INJECTION, SOLUTION INTRAVENOUS at 11:33

## 2020-01-01 RX ADMIN — CLOPIDOGREL BISULFATE 75 MG: 75 TABLET ORAL at 09:08

## 2020-01-01 RX ADMIN — FINASTERIDE 5 MG: 5 TABLET, FILM COATED ORAL at 11:11

## 2020-01-01 RX ADMIN — FLUTICASONE PROPIONATE 2 SPRAY: 50 SPRAY, METERED NASAL at 09:25

## 2020-01-01 RX ADMIN — CITALOPRAM HYDROBROMIDE 40 MG: 40 TABLET ORAL at 08:25

## 2020-01-01 RX ADMIN — CITALOPRAM HYDROBROMIDE 40 MG: 40 TABLET ORAL at 15:31

## 2020-01-01 RX ADMIN — LISINOPRIL 10 MG: 10 TABLET ORAL at 08:25

## 2020-01-01 RX ADMIN — SODIUM CHLORIDE 75 ML/HR: 9 INJECTION, SOLUTION INTRAVENOUS at 14:25

## 2020-01-01 RX ADMIN — ATORVASTATIN CALCIUM 20 MG: 20 TABLET, FILM COATED ORAL at 20:44

## 2020-01-01 RX ADMIN — FLUTICASONE PROPIONATE 2 SPRAY: 50 SPRAY, METERED NASAL at 08:25

## 2020-01-01 RX ADMIN — ATORVASTATIN CALCIUM 20 MG: 20 TABLET, FILM COATED ORAL at 19:48

## 2020-01-01 RX ADMIN — SODIUM CHLORIDE, PRESERVATIVE FREE 10 ML: 5 INJECTION INTRAVENOUS at 09:09

## 2020-01-01 RX ADMIN — CITALOPRAM HYDROBROMIDE 40 MG: 40 TABLET ORAL at 09:24

## 2020-01-01 RX ADMIN — SODIUM CHLORIDE 1000 ML: 9 INJECTION, SOLUTION INTRAVENOUS at 11:14

## 2020-01-01 RX ADMIN — DEXTROSE MONOHYDRATE 1500 MG: 50 INJECTION, SOLUTION INTRAVENOUS at 21:47

## 2020-01-01 RX ADMIN — CHLORHEXIDINE GLUCONATE: 213 SOLUTION TOPICAL at 23:00

## 2020-01-01 RX ADMIN — FLUTICASONE PROPIONATE 2 SPRAY: 50 SPRAY, METERED NASAL at 11:16

## 2020-01-01 RX ADMIN — LISINOPRIL 10 MG: 10 TABLET ORAL at 09:08

## 2020-01-01 RX ADMIN — SODIUM CHLORIDE, PRESERVATIVE FREE 10 ML: 5 INJECTION INTRAVENOUS at 09:25

## 2020-01-01 RX ADMIN — ASPIRIN 81 MG: 81 TABLET, COATED ORAL at 11:11

## 2020-01-01 RX ADMIN — IOPAMIDOL 160 ML: 612 INJECTION, SOLUTION INTRAVENOUS at 10:50

## 2020-01-01 RX ADMIN — CLOPIDOGREL BISULFATE 75 MG: 75 TABLET ORAL at 11:12

## 2020-01-01 RX ADMIN — ATORVASTATIN CALCIUM 80 MG: 40 TABLET, FILM COATED ORAL at 21:45

## 2020-01-01 RX ADMIN — SODIUM CHLORIDE: 9 INJECTION, SOLUTION INTRAVENOUS at 19:49

## 2020-01-01 RX ADMIN — FINASTERIDE 5 MG: 5 TABLET, FILM COATED ORAL at 09:24

## 2020-01-01 RX ADMIN — FLUTICASONE PROPIONATE 2 SPRAY: 50 SPRAY, METERED NASAL at 08:22

## 2020-01-01 RX ADMIN — CITALOPRAM HYDROBROMIDE 40 MG: 40 TABLET ORAL at 08:22

## 2020-01-01 RX ADMIN — LISINOPRIL 10 MG: 10 TABLET ORAL at 15:31

## 2020-01-01 RX ADMIN — LISINOPRIL 10 MG: 10 TABLET ORAL at 09:24

## 2020-01-01 RX ADMIN — ATORVASTATIN CALCIUM 20 MG: 20 TABLET, FILM COATED ORAL at 20:09

## 2020-01-01 RX ADMIN — ASPIRIN 81 MG: 81 TABLET, COATED ORAL at 09:24

## 2020-01-01 RX ADMIN — POTASSIUM CHLORIDE 40 MEQ: 1500 TABLET, EXTENDED RELEASE ORAL at 09:24

## 2020-01-01 RX ADMIN — ASPIRIN 81 MG: 81 TABLET, COATED ORAL at 13:08

## 2020-01-01 RX ADMIN — ACETAMINOPHEN 650 MG: 325 TABLET ORAL at 15:00

## 2020-01-01 RX ADMIN — SODIUM CHLORIDE, PRESERVATIVE FREE 10 ML: 5 INJECTION INTRAVENOUS at 19:48

## 2020-01-01 RX ADMIN — CLOPIDOGREL BISULFATE 75 MG: 75 TABLET ORAL at 09:24

## 2020-01-01 RX ADMIN — CLOPIDOGREL BISULFATE 75 MG: 75 TABLET ORAL at 13:08

## 2020-01-01 RX ADMIN — LISINOPRIL 10 MG: 10 TABLET ORAL at 11:14

## 2020-01-01 RX ADMIN — FINASTERIDE 5 MG: 5 TABLET, FILM COATED ORAL at 08:22

## 2020-01-01 RX ADMIN — ATORVASTATIN CALCIUM 80 MG: 40 TABLET, FILM COATED ORAL at 21:47

## 2020-01-01 ASSESSMENT — ENCOUNTER SYMPTOMS
NAUSEA: 0
VOMITING: 0
EYE DISCHARGE: 0
SHORTNESS OF BREATH: 0
ABDOMINAL DISTENTION: 0
FACIAL SWELLING: 0
ABDOMINAL PAIN: 0
NAUSEA: 0
COUGH: 0
SHORTNESS OF BREATH: 1
NAUSEA: 0
DIARRHEA: 0
RESPIRATORY NEGATIVE: 1
EYE DISCHARGE: 0
VOMITING: 0
EYE DISCHARGE: 0
WHEEZING: 0
SHORTNESS OF BREATH: 0
EYE DISCHARGE: 0
EYE REDNESS: 0
EYE PAIN: 0
RESPIRATORY NEGATIVE: 1
TROUBLE SWALLOWING: 0
RHINORRHEA: 1
ABDOMINAL PAIN: 0
CONSTIPATION: 0
SORE THROAT: 0
EYE ITCHING: 0
DIARRHEA: 0
GASTROINTESTINAL NEGATIVE: 1
NAUSEA: 0
ABDOMINAL PAIN: 0
EYE REDNESS: 0
NAUSEA: 0
ABDOMINAL PAIN: 0
DIARRHEA: 0
WHEEZING: 0
BACK PAIN: 0
BLOOD IN STOOL: 0
EYES NEGATIVE: 1
SHORTNESS OF BREATH: 1
BLOOD IN STOOL: 0
SHORTNESS OF BREATH: 0
EYE REDNESS: 0
RHINORRHEA: 0
NAUSEA: 1
COLOR CHANGE: 0
COUGH: 0
SHORTNESS OF BREATH: 1
TROUBLE SWALLOWING: 0
VOMITING: 0
WHEEZING: 0
EYE PAIN: 0
CHEST TIGHTNESS: 0
BLOOD IN STOOL: 0
COUGH: 0
NAUSEA: 0
GASTROINTESTINAL NEGATIVE: 1
SORE THROAT: 0
CONSTIPATION: 1
TROUBLE SWALLOWING: 0
VOMITING: 0
COUGH: 0
BACK PAIN: 0
EYE REDNESS: 0
NAUSEA: 0
VOMITING: 0
SHORTNESS OF BREATH: 0
COUGH: 1
GASTROINTESTINAL NEGATIVE: 1
ABDOMINAL PAIN: 0
COUGH: 0
EYE ITCHING: 0
EYE PAIN: 0
SHORTNESS OF BREATH: 0
CONSTIPATION: 0
DIARRHEA: 0
DIARRHEA: 0
VOMITING: 0
CHEST TIGHTNESS: 0
VOMITING: 0
ABDOMINAL DISTENTION: 0
SINUS PRESSURE: 0
EYES NEGATIVE: 1
VOICE CHANGE: 0
BLOOD IN STOOL: 0
COLOR CHANGE: 0
EYES NEGATIVE: 1
TROUBLE SWALLOWING: 0
WHEEZING: 0
SHORTNESS OF BREATH: 0
BLOOD IN STOOL: 0
EYE REDNESS: 0
RESPIRATORY NEGATIVE: 1
SINUS PRESSURE: 0
FACIAL SWELLING: 0

## 2020-01-01 ASSESSMENT — PAIN SCALES - GENERAL
PAINLEVEL_OUTOF10: 0
PAINLEVEL_OUTOF10: 3
PAINLEVEL_OUTOF10: 0
PAINLEVEL_OUTOF10: 3
PAINLEVEL_OUTOF10: 0
PAINLEVEL_OUTOF10: 1
PAINLEVEL_OUTOF10: 0

## 2020-01-01 ASSESSMENT — PATIENT HEALTH QUESTIONNAIRE - PHQ9
SUM OF ALL RESPONSES TO PHQ QUESTIONS 1-9: 0
SUM OF ALL RESPONSES TO PHQ9 QUESTIONS 1 & 2: 0
SUM OF ALL RESPONSES TO PHQ QUESTIONS 1-9: 0
1. LITTLE INTEREST OR PLEASURE IN DOING THINGS: 0
2. FEELING DOWN, DEPRESSED OR HOPELESS: 0

## 2020-01-01 ASSESSMENT — PAIN DESCRIPTION - PAIN TYPE: TYPE: SURGICAL PAIN

## 2020-01-01 ASSESSMENT — PAIN DESCRIPTION - LOCATION: LOCATION: CHEST

## 2020-01-01 ASSESSMENT — LIFESTYLE VARIABLES: HOW OFTEN DO YOU HAVE A DRINK CONTAINING ALCOHOL: 0

## 2020-01-01 ASSESSMENT — PAIN DESCRIPTION - ORIENTATION: ORIENTATION: LEFT;UPPER

## 2020-01-03 NOTE — TELEPHONE ENCOUNTER
Pt seen 9/12/19    Requested Prescriptions     Pending Prescriptions Disp Refills    warfarin (COUMADIN) 5 MG tablet [Pharmacy Med Name: Warfarin Sodium 5 MG Oral Tablet] 135 tablet 0     Sig: TAKE 1 & 1/2 (ONE & ONE-HALF) TABLETS BY MOUTH ONCE DAILY

## 2020-01-28 NOTE — PATIENT INSTRUCTIONS
Patient's contact number:  [phone] (home)     Echocardiogram -  No prep. Takes approximately 30 min. An echocardiogram uses sound waves to produce images of your heart. This commonly used test allows your doctor to see how your heart is beating and pumping blood. Your doctor can use the images from an echocardiogram to identify various abnormalities in the heart muscle and valves. This test has 2 parts:   Ø You will be asked to disrobe from the waist up and given a gown to wear. The technologist will then hook up an EKG monitor to you for the entire exam.   Ø You will then have an ultrasound of your heart (echocardiogram) to assess the heart muscle, heart valves and heart function. You may eat and take any medicines before the exam.     If you need to change your appointment, please call outpatient scheduling at [phone].       14-day monitor (ZIO Patch)      ZIO Patch  The Basia Fallen is a new form of ambulatory cardiac monitoring described as a wearable patch. The [place] is unique compared to traditional Holter monitors as the monitoring device has no leads, no wires and no batteries. The [place] weighs just a few ounces that is a peel and stick device that is worn for an extended monitoring period of up to 14 days. Even though the device is worn for a longer duration than a Holter monitor, the ZIO Patch is said to be preferred by nearly 80% of patients as compared to a traditional 24 Holter monitor. Please allow 8 to 10 days for results, once you have mailed off your device.     Features of the ZIO Patch:  Arguably the smallest ambulatory cardiac monitor   Light weight   No lead wires   Single channel ECG recording   No batteries   Superior patient compliance with a water resistant   Up to 14 days of continuous ECG recording      Understanding Blood Pressure Readings    Blood pressure is typically recorded as two numbers, written as a ratio like this:      Systolic:  The top number, which is also the higher of the two numbers, measures the pressure in the arteries when the heart beats (when the heart muscle contracts). Diastolic:  The bottom number, which is also the lower of the two numbers, measures the pressure in the arteries between heartbeats (when the heart muscle is resting between beats and refilling with blood). This chart reflects blood pressure categories defined by the American Heart Association. Blood Pressure  Category Systolic  mm Hg (upper #)  Diastolic  mm Hg (lower #)   Normal   less than 120 and less than 80   Prehypertension 120 - 139 or 80 - 89   High Blood Pressure  (Hypertension) Stage 1 140 - 159 or 90 - 99   High Blood Pressure  (Hypertension) Stage 2 160 or higher or 100 or higher   Hypertensive Crisis Higher than 180 or Higher than 110     Take your blood pressure a couple times a day at different times of the day. If readings are anymore than 208 or more systolic, or 90 or above diastolic, with regularity, let us know. We may need to make a medication adjustment.

## 2020-01-28 NOTE — PROGRESS NOTES
LAB PROCEDURE  03    EF over 60% Normal LV function and hemodynamics Mild diffuse nonocclusive CAD w/o hemodynamically significant lesions identified      Family History   Problem Relation Age of Onset    Hypertension Mother     Cancer Father     Diabetes Brother      Social History     Tobacco Use    Smoking status: Former Smoker     Packs/day: 2.00     Years: 40.00     Pack years: 80.00     Last attempt to quit: 1989     Years since quittin.5    Smokeless tobacco: Former User     Types: Chew   Substance Use Topics    Alcohol use: No      Current Outpatient Medications   Medication Sig Dispense Refill    Docusate Sodium (EQ STOOL SOFTENER PO) Take 100 mg by mouth 2 times daily      warfarin (COUMADIN) 5 MG tablet TAKE 1 & 1/2 (ONE & ONE-HALF) TABLETS BY MOUTH ONCE DAILY 135 tablet 0    metoprolol succinate (TOPROL XL) 50 MG extended release tablet Take 0.5 tablets by mouth daily 30 tablet 11    omeprazole (PRILOSEC) 40 MG delayed release capsule TAKE 1 CAPSULE BY MOUTH ONCE DAILY 90 capsule 3    lisinopril (PRINIVIL;ZESTRIL) 10 MG tablet Take 1 tablet by mouth daily 90 tablet 3    atorvastatin (LIPITOR) 20 MG tablet Take 1 tablet by mouth nightly 30 tablet 11    citalopram (CELEXA) 40 MG tablet Take 1 tablet by mouth daily 30 tablet 11    fluticasone (FLONASE) 50 MCG/ACT nasal spray 2 sprays by Each Nostril route daily 3 Bottle 1    finasteride (PROSCAR) 5 MG tablet Take 1 tablet by mouth daily 30 tablet 5    Multiple Vitamins-Minerals (EYE VITAMINS PO) Take 2 tablets by mouth daily       aspirin 81 MG EC tablet Take 81 mg by mouth daily. No current facility-administered medications for this visit. Allergies: Pcn [penicillins]    Review of Systems  Review of Systems   Constitutional: Negative for activity change, diaphoresis, fatigue, fever and unexpected weight change. HENT: Negative for facial swelling, nosebleeds and trouble swallowing.     Eyes: Negative for Skin:     General: Skin is warm and dry. Capillary Refill: Capillary refill takes less than 2 seconds. Findings: No rash. Neurological:      Mental Status: He is alert and oriented to person, place, and time. Psychiatric:         Behavior: Behavior normal.         Judgment: Judgment normal.         Cardiac data:    ECG 01/28/20  Questionable atrial fibrillation with nonspecific ST abnormalities, 60 bpm    QTc 0.476 ms    TTE 1/28/2019   Normal left ventricular size with preserved LV function and an estimated   ejection fraction of approximately 45%. No evidence of left ventricular mass or thrombus noted. Mild concentric left ventricular hypertrophy. E/A flow reversal diastolic filling pattern consistent with Grade I diastolic dysfunction. Cath 10/3/2011  1. Normal left ventricular function and hemodynamics, with a normal ejection fraction of over 60%. 2.  Nonocclusive coronary artery disease without hemodynamically significant lesions as described above. Assessment, Recommendations, & Plan:  78 y.o. male with      Diagnosis Orders   1. Chest pain, unspecified type  EKG 12 lead    ECHO Pharmacological Stress Test    IA EXT ECG > 48HR TO 21 DAY RCRD W/CONECT INTL RCRD (Zio Recording)    Echo 2D w doppler w color complete   2. Coronary artery disease involving native coronary artery of native heart without angina pectoris     3. Murmur, cardiac  Echo 2D w doppler w color complete   4. Palpitations  IA EXT ECG > 48HR TO 21 DAY RCRD W/CONECT INTL RCRD (Zio Recording)   5. Essential hypertension     6. Mixed hyperlipidemia     7. Atrial fibrillation, unspecified type ([address]         1. Chest pain-atypical chest pain however with continued symptoms and abnormal EKG recommend dobutamine stress echo. 2.  Coronary artery disease-nonocclusive disease in 2011. Going to do dobutamine stress echo. 3.  Murmur-mild aortic regurgitation and trivial mitral regurgitation on echo a year ago.   With continued shortness of breath will repeat 2D echo. 4.  Palpitations-patient continues to complain of palpitations with associated lightheadedness and dizziness. Will order 14-day event monitor to evaluate. 5.  Hypertension-blood pressure today 136/72. Patient describes his blood pressure is waxing and waning at home. Advised to keep a blood pressure log and we will reevaluate when upon follow-up appointment. 6.  Hyperlipidemia-on statin therapy. Managed by PCP. 7.  Atrial fibrillation-patient anticoagulated with Coumadin and on metoprolol for rate control. Orders Placed This Encounter   Procedures    EKG 12 lead     Order Specific Question:   Reason for Exam?     Answer:   Chest pain    ECHO Pharmacological Stress Test     Dobutamine stress echo     Scheduling Instructions:      Dobutamine Stress Echo Test     Order Specific Question:   Reason for exam:     Answer:   chest pain, abnormal ekg    Echo 2D w doppler w color complete     Standing Status:   Future     Standing Expiration Date:   1/28/2021     Order Specific Question:   Reason for exam:     Answer:   mumur, sob, chest pain    VT EXT ECG > 48HR TO 21 DAY RCRD W/CONECT INTL RCRD (Zio Recording)     14 day     Return in about 4 weeks (around 2/25/2020). Call with any questionsor concerns  Follow up with Isis Davison DO for non cardiac problems  Report any new problems  Cardiovascular Fitness-Exercise as tolerated. Strive for 15 minutes of exercise most days of the week. Cardiac / HealthyDiet  Continue current medications as directed  Continue plan of treatment  It is always recommended that you bring your medicationsbottles with you to each visit - this is for your safety! Please do not hesitate to contact me for any questions or concerns. Sincerely yours,    CONNOR Shoemaker    This dictation was generated by voice recognition computer software.  Although all attempts are made to edit dictation for accuracy,

## 2020-01-29 NOTE — TELEPHONE ENCOUNTER
Pt seen 9/12/19.       Requested Prescriptions     Pending Prescriptions Disp Refills    finasteride (PROSCAR) 5 MG tablet 30 tablet 5     Sig: Take 1 tablet by mouth daily

## 2020-02-18 NOTE — TELEPHONE ENCOUNTER
Tried to call patient and wife answered-I told her that Renetta Grande CNP was wanting to know if he ever did is duobutamine stress test and echo-and she stated that no he did not that he changed his mind-I stated that we got his heart monitor results back and it showed several runs of ventricular tachycardia and that there could be a blockage and that is why she wants him to have those tests done-I asked her to relay this information while I was on the phone to him-and I heard him say that he would think about it and get back with us. I also informed them that Renetta Grande CNP stated that he could stop taking his metoprolol and that she was going to talk with Dr. Addie Up. Patient and wife voiced understanding and said they would get back with us later today or in the morning. [address]

## 2020-02-28 NOTE — PATIENT INSTRUCTIONS
Orders Placed This Encounter   Procedures    ME EXT ECG > 48HR TO 21 DAY RCRD W/CONECT INTL RCRD (Zio Recording)     Return in about 4 weeks (around 3/27/2020). Call with any questionsor concerns  Follow up with Neetu Carcamo, DO for non cardiac problems  Report any new problems  Cardiovascular Fitness-Exercise as tolerated. Strive for 15 minutes of exercise most days of the week. Cardiac / HealthyDiet  Continue current medications as directed  Continue plan of treatment  It is always recommended that you bring your medicationsbottles with you to each visit - this is for your safety! 14-day monitor (ZIO Patch)      ZIO Patch  The ZIO® Patch is a new form of ambulatory cardiac monitoring described as a wearable patch. The Lorrine Wilfrid is unique compared to traditional Holter monitors as the monitoring device has no leads, no wires and no batteries. The Lorrine Wilfrid weighs just a few ounces that is a peel and stick device that is worn for an extended monitoring period of up to 14 days. Even though the device is worn for a longer duration than a Holter monitor, the ZIO Patch is said to be preferred by nearly 80% of patients as compared to a traditional 24 Holter monitor. Please allow 8 to 10 days for results, once you have mailed off your device. Features of the ZIO Patch:  Arguably the smallest ambulatory cardiac monitor   Light weight   No lead wires   Single channel ECG recording   No batteries   Superior patient compliance with a water resistant   Up to 14 days of continuous ECG recording        [place] at the [place] [address] [address] [facility] located on the first floor of [place] [address] through hospital main entrance and turn immediately to your left. Date/Time:     Patient's contact number:  [phone] (home)     Echocardiogram -  No prep. Takes approximately 30 min. An echocardiogram uses sound waves to produce images of your heart.  This commonly used test allows your doctor to see how your heart is beating and pumping blood. Your doctor can use the images from an echocardiogram to identify various abnormalities in the heart muscle and valves. This test has 2 parts:   Ø You will be asked to disrobe from the waist up and given a gown to wear. The technologist will then hook up an EKG monitor to you for the entire exam.   Ø You will then have an ultrasound of your heart (echocardiogram) to assess the heart muscle, heart valves and heart function. You may eat and take any medicines before the exam.     If you need to change your appointment, please call outpatient scheduling at [phone]. [place] at the [address] [place] [address] and [address] [facility] located on the first floor of [place] [address] through hospital main entrance and turn immediately to your left. Patient's contact number:  [phone] (home)     Date/Time:     Dobutamine Stress Test    A chemical stress test uses chemical agents injected into the body through the vein. These chemicals make the heart function as if it were under stress. A chemical stress test is used when a traditional stress test (called a cardiac stress test) cannot be done. Testing will take approximately one hour. Dobutamine Stress Echocardiogram Instructions:   No caffeine 24 hours prior to the testing. This includes: coffee, pop/soda, chocolate, cold medications, etc.  Any product that might contain caffeine. Do not eat or drink anything, except water, eight (8) hours before the test.   No alcohol or nicotine twelve (12) hours prior to your test.   Wear comfortable clothing. If you are taking metoprolol, stop morning of this procedure. If you need to change this appointment, please call outpatient scheduling at [phone].

## 2020-02-28 NOTE — PROGRESS NOTES
prostatic hyperplasia with urinary hesitancy 08/01/2018    S/P coronary artery stent placement 06/20/2017    Anxiety     Insomnia     Hypertension     Vertigo 02/10/2014    Gastroesophageal reflux disease     CAD (coronary artery disease)     [facility]     Hyperlipidemia     Coronary artery disease 07/18/2011    Supraventricular tachycardia ([address] 07/18/2011     Past Medical History:   Diagnosis Date    Anxiety     CAD (coronary artery disease)     Gastroesophageal reflux disease     Hyperlipidemia     Cholesterol management per pcp, EDITH Auguste M.D.   [employer]elia [employer]ru[employer] Hypertension     Insomnia     Mitral valve disorder     Pancreatitis     SVT (supraventricular tachycardia) ([facility])      Past Surgical History:   Procedure Laterality Date    APPENDECTOMY      CARDIAC CATHETERIZATION  10/3/11    CARDIAC CATHETERIZATION  9/1/1987    Normal left ventricular function and hemodynamics , Normal coronary arteriograms    CHOLECYSTECTOMY      CORONARY ANGIOPLASTY WITH STENT PLACEMENT      DIAGNOSTIC CARDIAC CATH LAB PROCEDURE  3/24/10    EF over 60%  Normal left ventricular function and hemodynamics, Diffuse nonocclusive CAD , w/ a patent stent in the circumflex marginal branch    DIAGNOSTIC CARDIAC CATH LAB PROCEDURE  10/28/08    EF 60% Normal LV function and hemodynamics, Diffuse non-occlusive CAD     DIAGNOSTIC CARDIAC CATH LAB PROCEDURE  5/8/07    EF over 60% Normal LV chamber size and wall motion, Diffuse nonocclusive CAD     DIAGNOSTIC CARDIAC CATH LAB PROCEDURE  4/11/06    EF over 60%  Normal LV function and hemodynamics, Severe two- vessel CAD , Successful percutaneous coronary intevention w/cuttng balloon angioplasty to a small second circumflex marginal branch and primary stent placement using a drug eluting stent to the diagonal branch of the LAD     DIAGNOSTIC CARDIAC CATH LAB PROCEDURE  9/9/03    EF over 60% Normal LV function and hemodynamics Mild diffuse nonocclusive CAD w/o hemodynamically significant lesions identified      Family History   Problem Relation Age of Onset    Hypertension Mother     Cancer Father     Diabetes Brother      Social History     Tobacco Use    Smoking status: Former Smoker     Packs/day: 2.00     Years: 40.00     Pack years: 80.00     Last attempt to quit: 1989     Years since quittin.5    Smokeless tobacco: Former User     Types: Chew   Substance Use Topics    Alcohol use: No      Current Outpatient Medications   Medication Sig Dispense Refill    finasteride (PROSCAR) 5 MG tablet Take 1 tablet by mouth daily 90 tablet 3    Docusate Sodium (EQ STOOL SOFTENER PO) Take 100 mg by mouth 2 times daily      warfarin (COUMADIN) 5 MG tablet TAKE 1 & 1/2 (ONE & ONE-HALF) TABLETS BY MOUTH ONCE DAILY 135 tablet 0    omeprazole (PRILOSEC) 40 MG delayed release capsule TAKE 1 CAPSULE BY MOUTH ONCE DAILY 90 capsule 3    lisinopril (PRINIVIL;ZESTRIL) 10 MG tablet Take 1 tablet by mouth daily 90 tablet 3    atorvastatin (LIPITOR) 20 MG tablet Take 1 tablet by mouth nightly 30 tablet 11    citalopram (CELEXA) 40 MG tablet Take 1 tablet by mouth daily 30 tablet 11    fluticasone (FLONASE) 50 MCG/ACT nasal spray 2 sprays by Each Nostril route daily 3 Bottle 1    Multiple Vitamins-Minerals (EYE VITAMINS PO) Take 2 tablets by mouth daily       aspirin 81 MG EC tablet Take 81 mg by mouth daily. No current facility-administered medications for this visit. Allergies: Pcn [penicillins]    Review of Systems  Review of Systems   Constitutional: Negative for activity change, diaphoresis, fatigue, fever and unexpected weight change. HENT: Negative for facial swelling, nosebleeds and trouble swallowing. Eyes: Negative for discharge, redness and visual disturbance. Respiratory: Positive for shortness of breath. Negative for cough and wheezing. Cardiovascular: Positive for palpitations. Negative for chest pain and leg swelling. person, place, and time. Psychiatric:         Behavior: Behavior normal.         Judgment: Judgment normal.         Cardiac data:    ECG 01/28/2020  Questionable atrial fibrillation with nonspecific ST abnormalities, 60 bpm    QTc 0.476 ms    TTE 1/28/2019   Normal left ventricular size with preserved LV function and an estimated   ejection fraction of approximately 45%. No evidence of left ventricular mass or thrombus noted. Mild concentric left ventricular hypertrophy. E/A flow reversal diastolic filling pattern consistent with Grade I diastolic dysfunction. Cath 10/3/2011  1. Normal left ventricular function and hemodynamics, with a normal ejection fraction of over 60%. 2.  Nonocclusive coronary artery disease without hemodynamically significant lesions as described above. Assessment, Recommendations, & Plan:  78 y.o. male with      Diagnosis Orders   1. Palpitations  AZ EXT ECG > 48HR TO 21 DAY RCRD W/CONECT INTL RCRD ([facility]io Recording)   2. AV block, Mobitz 1  AZ EXT ECG > 48HR TO 21 DAY RCRD W/CONECT INTL RCRD ([facility] Recording)   3. Valvular heart disease     4. Coronary artery disease involving native coronary artery of native heart without angina pectoris     5. Essential hypertension     6. Mixed hyperlipidemia         1. Palpitations-patient had 6 runs of ventricular tachycardia on recent ZIO patch. The fastest interval lasted 6 beats with a max rate of 152 bpm, the longest lasting 7 beats with an average rate of 104 bpm.  Dobutamine stress echo and 2D echo was ordered at last appointment however patient did not want to do it. Patient is agreeable at this time we will get this scheduled. We will also repeat ZIO patch since patient is no longer on metoprolol. 2.  Mobitz 1-present on recent ZIO patch. Will repeat ZIO patch to evaluate since metoprolol has been discontinued. 3.  Valvular heart disease- mild aortic regurgitation and trivial mitral regurgitation on echo a year ago.   With continued shortness of breath will repeat 2D echo. 4.  Coronary artery disease-nonocclusive on last heart cath 2011. Recommended patient to have the dobutamine stress echo and 2D echo that was ordered at previous visit. Patient voiced understanding is and is agreeable at this time. 5.  Hypertension-blood pressure today 132/88. 6.  Hyperlipidemia-on statin therapy. Managed by PCP. Orders Placed This Encounter   Procedures    SC EXT ECG > 48HR TO 21 DAY RCRD W/CONECT INTL RCRD ([facility])     Return in about 4 weeks (around 3/27/2020). Call with any questionsor concerns  Follow up with Debbie Sanz DO for non cardiac problems  Report any new problems  Cardiovascular Fitness-Exercise as tolerated. Strive for 15 minutes of exercise most days of the week. Cardiac / HealthyDiet  Continue current medications as directed  Continue plan of treatment  It is always recommended that you bring your medicationsbottles with you to each visit - this is for your safety! Please do not hesitate to contact me for any questions or concerns. Sincerely yours,    CONNOR Henderson    This dictation was generated by voice recognition computer software. Although all attempts are made to edit dictation for accuracy, there may be errors in the transcription that are not intended.

## 2020-03-02 NOTE — TELEPHONE ENCOUNTER
----- Message from [address] [facility],[address], DO sent at 3/2/2020 12:22 PM CST -----  Coumadin level is right where we need it. No changes. Re-check in 1 month.

## 2020-03-21 PROBLEM — R55 NEAR SYNCOPE: Status: ACTIVE | Noted: 2020-01-01

## 2020-03-21 PROBLEM — R00.1 BRADYCARDIA: Status: ACTIVE | Noted: 2020-01-01

## 2020-03-21 NOTE — H&P
[id number] [facility]  History and Physical    Patient:  Yudith Cox  MRN: [id number]    CHIEF COMPLAINT:    Chief Complaint   Patient presents with    Loss of Consciousness     presents with c/o syncopal episode         History Obtained From: Patient    PCP: Chip Bermudez DO    HISTORY OF PRESENT ILLNESS:   78 y.o. male who presents with recurrent near syncopal episodes which have been more frequent recently occurring at least once a week. Today about 1030 he had tried to put a sac of trash in the pickup and subsequently felt weak and dizzy almost fainted and passed out no definite chest discomfort but he is more dyspneic recently had a recent stress test abnormal 3/16/2020 cardiac catheterization suggested but not yet arranged. Here heart rates in the 40s recent Ziehl patch also showed heart rates as low as 25. On chronic warfarin therapy. Most recent INR available today 2.61 on 3/2/2020. Initial troponin negative proBNP level 492. REVIEW OF SYSTEMS:  Review of Systems   Constitutional: Negative. Negative for chills, fever and unexpected weight change. HENT: Negative. Eyes: Negative. Respiratory: Negative. Negative for shortness of breath. Cardiovascular: Negative. Negative for chest pain. Gastrointestinal: Negative. Negative for diarrhea, nausea and vomiting. Endocrine: Negative. Genitourinary: Negative. Musculoskeletal: Negative. Skin: Negative. Neurological: Negative. All other systems reviewed and are negative.       Cardiac Specific Data:   Specialty Problems        Cardiology Problems    Coronary artery disease        Supraventricular tachycardia (HCC)        CAD (coronary artery disease)        Hyperlipidemia        SVT (supraventricular tachycardia) (HCC)        Hypertension        Near syncope              Past Medical History:      Diagnosis Date    Anxiety     CAD (coronary artery disease)     Gastroesophageal reflux disease     Hyperlipidemia Cholesterol management per pcp, B. Helen Scheuermann M.D.   [employer] Hypertension     Insomnia     Mitral valve disorder     Pancreatitis     SVT (supraventricular tachycardia) (HCC)        Past Surgical History:      Procedure Laterality Date    APPENDECTOMY      CARDIAC CATHETERIZATION  10/3/11    CARDIAC CATHETERIZATION  9/1/1987    Normal left ventricular function and hemodynamics , Normal coronary arteriograms    CHOLECYSTECTOMY      CORONARY ANGIOPLASTY WITH STENT PLACEMENT      DIAGNOSTIC CARDIAC CATH LAB PROCEDURE  3/24/10    EF over 60%  Normal left ventricular function and hemodynamics, Diffuse nonocclusive CAD , w/ a patent stent in the circumflex marginal branch    DIAGNOSTIC CARDIAC CATH LAB PROCEDURE  10/28/08    EF 60% Normal LV function and hemodynamics, Diffuse non-occlusive CAD     DIAGNOSTIC CARDIAC CATH LAB PROCEDURE  5/8/07    EF over 60% Normal LV chamber size and wall motion, Diffuse nonocclusive CAD     DIAGNOSTIC CARDIAC CATH LAB PROCEDURE  4/11/06    EF over 60%  Normal LV function and hemodynamics, Severe two- vessel CAD , Successful percutaneous coronary intevention w/cuttng balloon angioplasty to a small second circumflex marginal branch and primary stent placement using a drug eluting stent to the diagonal branch of the LAD     DIAGNOSTIC CARDIAC CATH LAB PROCEDURE  9/9/03    EF over 60% Normal LV function and hemodynamics Mild diffuse nonocclusive CAD w/o hemodynamically significant lesions identified        Medications Prior to Admission:    Prior to Admission medications    Medication Sig Start Date End Date Taking?  Authorizing Provider   fluticasone (FLONASE) 50 MCG/ACT nasal spray 2 sprays by Nasal route daily 3/4/20  Yes CONNOR Bowles   finasteride (PROSCAR) 5 MG tablet Take 1 tablet by mouth daily 1/29/20  Yes NORIS Russ,    Docusate Sodium (EQ STOOL SOFTENER PO) Take 100 mg by mouth 2 times daily   Yes Historical Provider, MD   warfarin (COUMADIN) 5 MG tablet organizations: Not on file     Relationship status: Not on file    Intimate partner violence     Fear of current or ex partner: Not on file     Emotionally abused: Not on file     Physically abused: Not on file     Forced sexual activity: Not on file   Other Topics Concern    Not on file   Social History Narrative     58 years only marriage    He has 1 daughter    Retired [employer]    Never in the [employer] eighth grade    He does continue to drive an automobile    Walks poorly in recent years    Smoked previously 2 packs/day quit in 1901 [facility] denies alcohol consumption or substance usage       Family History:       Problem Relation Age of Onset    Hypertension Mother     Cancer Father     Diabetes Brother            Physical Exam:    Vitals: BP (!) 154/86   Pulse 52   Temp 97.9 °F (36.6 °C)   Resp 18   Ht 6' (1.829 m)   Wt 215 lb (97.5 kg)   SpO2 93%   BMI 29.16 kg/m²   24HR INTAKE/OUTPUT:  No intake or output data in the 24 hours ending 03/21/20 1350    Physical Exam  Vitals signs reviewed. Constitutional:       General: He is not in acute distress. Appearance: Normal appearance. He is well-developed. He is not ill-appearing, toxic-appearing or diaphoretic. HENT:      Head: Normocephalic and atraumatic. Nose: Nose normal.      Mouth/Throat:      Mouth: Mucous membranes are moist.      Pharynx: Oropharynx is clear. Eyes:      General: No scleral icterus. Extraocular Movements: Extraocular movements intact. Pupils: Pupils are equal, round, and reactive to light. Neck:      Musculoskeletal: Normal range of motion and neck supple. No neck rigidity or muscular tenderness. Vascular: No carotid bruit or JVD. Cardiovascular:      Rate and Rhythm: Normal rate and regular rhythm. Heart sounds: Normal heart sounds. No murmur. No friction rub. No gallop. Pulmonary:      Effort: Pulmonary effort is normal. No respiratory distress. Breath sounds: Normal breath sounds. No stridor. No wheezing, rhonchi or rales. Chest:      Chest wall: No tenderness. Abdominal:      General: Abdomen is flat. Bowel sounds are normal. There is no distension. Palpations: Abdomen is soft. There is no mass. Tenderness: There is no abdominal tenderness. There is no right CVA tenderness, left CVA tenderness, guarding or rebound. Hernia: No hernia is present. Musculoskeletal:         General: No swelling, tenderness, deformity or signs of injury. Right lower leg: No edema. Left lower leg: No edema. Lymphadenopathy:      Cervical: No cervical adenopathy. Skin:     General: Skin is warm and dry. Neurological:      General: No focal deficit present. Mental Status: He is alert and oriented to person, place, and time. Mental status is at baseline. Cranial Nerves: No cranial nerve deficit. Sensory: No sensory deficit. Motor: No weakness. Coordination: Coordination normal.      Gait: Gait normal.   Psychiatric:         Mood and Affect: Mood normal.         Behavior: Behavior normal.         Thought Content: Thought content normal.         Judgment: Judgment normal.         LAB DATA:  CBC:   Recent Labs     03/21/20  1212   WBC 6.0   HGB 14.9        BMP:    Recent Labs     03/21/20  1212      K 3.4*      CO2 24   BUN 16   CREATININE 0.6   GLUCOSE 166*     Hepatic:   Recent Labs     03/21/20  1212   AST 16   ALT 14   BILITOT 0.4   ALKPHOS 125     CK, CKMB, Troponin: @LABRCNT (CKTOTAL:3, CKMB:3, TROPONINI:3)@  Pro-BNP: No results for input(s): BNP in the last 72 hours. Lipids: No results for input(s): CHOL, HDL in the last 72 hours. Invalid input(s): LDL  ABGs: No results for input(s): PHART, VNT9KKM, PO2ART, CVG9GHJ, BEART, HGBAE, V7PMCYSU, CARBOXHGBART, 02THERAPY in the last 72 hours. INR:   Recent Labs     03/21/20  1212   INR 2.35*     A1c:Invalid input(s):  HEMOGLOBIN A1C  URINALYSIS: -----------------------------------------------------------------  IMAGING:    Xr Chest Standard (2 Vw)    Result Date: 3/21/2020  Exam:   XR CHEST (2 VW)  Date:  3/21/2020 History:  Male, age  78 years; near syncope COMPARISON:  Chest x-ray dated 6/60/18. Findings : Borderline size of the cardiomediastinal silhouette, without convincing evidence of fluid overload. Chronic interstitial lung changes. Lungs are without focal infiltrate, mass or effusions. The bones show no acute pathology. Impression: No acute cardiopulmonary disease. Signed by Dr Wilberto Landrum on 3/21/2020 12:36 PM        Assessment:    1. Near syncopal episode  2. Bradycardia  3. Hypertension  4. Prior tobacco abuse discontinued 1989  5. Coronary artery disease  6. History of multiple heart caths in the past with several stents previously placed remotely  7. Recent abnormal stress test 3/16/2020 with EKG changes  8. Recent abnormal ZIO Patch with heart rates as low as 25  9. Chronic anticoagulation with warfarin  10. History of SVT  11. Gastroesophageal reflux disease  12. Hyperlipidemia  13. Vertigo  14. Anxiety  15. Benign prostatic hyperplasia    Recommendations:    1. Admit telemetry  2. Serial EKGs and cardiac enzymes  3. Hold warfarin and other medications  4. We will see how he does overnight on telemetry but I note that he is really not on any medications that would be affecting his heart rate need to review his previous ZIO patch but I suspect he will probably need a pacemaker depending on various factors. .  5. Will contemplate possible diagnostic cardiac catheterization tomorrow advised indications alternatives benefits and risk patient agreeable  I have discussed with the patient regarding indications for the proposed procedure LEFT HEART CATHETERIZATION AND POSSIBLE PERCUTANEOUS INTERVENTION  along with possible alternatives benefits and risks including but not limited to risks of death, myocardial infarction, stroke, contrast induced nephropathy which in some cases may lead to acute kidney failure requiring dialysis, allergic reactions, bleeding requiring blood transfusion,  cardiac arrhthymias, respiratory failure which may require placing the patient on respiratory support such as a ventilator or breathing machine,risk of complications which may require vascular surgery, and if coronary intervention is performed emergency CABG may be required in less than 1% of cases. The patient is awake and alert and understands the issues involved and indicates willingness to proceed as ordered. The patient does not have any contraindications to dual antiplatelet therapy. The patient does not have any known  pending surgical procedures in the next 12 months at this time. The patient is  a reasonable candidate for moderate conscious sedation.     ASA score:  ASA 3 - Patient with moderate systemic disease with functional limitations    Mallampati: I (soft palate, uvula, fauces, tonsillar pillars visible)    Preferred vascular access site will be: right radial artery          Gonzalez Rojas MD 3/21/2020 @ [facility]@

## 2020-03-21 NOTE — ED TRIAGE NOTES
presents with c/o near syncopal episode #S/p mechanical fall with LT hip fracture  Admit to [address] [place] [address] eval appreciated  Bedrest  NPO, IVF  For OR tonight  EKG- NSR, ECHO- nl EF, no wall motion abnormalities, CXR- neg  Patient is medically optimized for OR    #COPD  Cont nebs, Budesonide    #Hypothyroidism- cont synthroid    #Dispo- inpatient admit, medically stable for OR

## 2020-03-21 NOTE — ED PROVIDER NOTES
[address]      Pt Name: Gloria Alberto  MRN: [id number]  [place] 1940  Date of evaluation: 3/21/2020  Provider: Florencia Toro MD    CHIEF COMPLAINT       Chief Complaint   Patient presents with    Loss of Consciousness     presents with c/o syncopal episode          HISTORY OF PRESENT ILLNESS   (Location/Symptom, Timing/Onset,Context/Setting, Quality, Duration, Modifying Factors, Severity)  Note limiting factors. Gloria Alberto is a 78 y.o. male who presents to the emergency department for evaluation after an episode of dizziness/lightheadedness with shortness of breath. States he was taking trash out and throwing it in the back of his truck to take into the dumpster when the symptoms started. States he was able to walk himself up to his porch and sit in a chair. States he had associated nausea at the time. Even with sitting down on the chair he states his symptoms lasted for approximately 30 minutes. Denies any associated chest pain during the event. Patient states he has had several recent episodes that were similar in nature. States he has never fully lost consciousness but has had his vision go dark several times. Patient has had recent work-up for these symptoms as well as recent shortness of breath and reported chest pain. Patient had recent echo and stress test last week and is scheduled to have a heart catheterization in the next 1 to 2 months. HPI    NursingNotes were reviewed. REVIEW OF SYSTEMS    (2-9 systems for level 4, 10 or more for level 5)     Review of Systems   Constitutional: Negative for fatigue and fever. HENT: Negative for congestion, rhinorrhea and voice change. Eyes: Negative for pain and redness. Respiratory: Positive for shortness of breath. Negative for cough. Cardiovascular: Negative for chest pain. Gastrointestinal: Positive for nausea. Negative for abdominal pain, diarrhea and vomiting.    Endocrine: Negative. Genitourinary: Negative. Musculoskeletal: Negative for arthralgias and gait problem. Skin: Negative for rash and wound. Neurological: Positive for dizziness and light-headedness. Negative for weakness and headaches. Hematological: Negative. Psychiatric/Behavioral: Negative. All other systems reviewed and are negative. A complete review of systems was performed and is negative except as noted above in the HPI. PAST MEDICAL HISTORY     Past Medical History:   Diagnosis Date    Anxiety     Arthritis     CAD (coronary artery disease)     Gastroesophageal reflux disease     Hyperlipidemia     Cholesterol management per EDITH unger M.D. Hypertension     Insomnia     Mitral valve disorder     Pancreatitis     SVT (supraventricular tachycardia) ([facility] [address]          SURGICAL HISTORY       Past Surgical History:   Procedure Laterality Date    APPENDECTOMY      CARDIAC CATHETERIZATION  10/3/11    CARDIAC CATHETERIZATION  9/1/1987    Normal left ventricular function and hemodynamics , Normal coronary arteriograms    CHOLECYSTECTOMY      COLONOSCOPY      CORONARY ANGIOPLASTY WITH STENT PLACEMENT      DIAGNOSTIC CARDIAC CATH LAB PROCEDURE  3/24/10    EF over 60%  Normal left ventricular function and hemodynamics, Diffuse nonocclusive CAD , w/ a patent stent in the circumflex marginal branch    DIAGNOSTIC CARDIAC CATH LAB PROCEDURE  10/28/08    EF 60% Normal LV function and hemodynamics, Diffuse non-occlusive CAD     DIAGNOSTIC CARDIAC CATH LAB PROCEDURE  5/8/07    EF over 60% Normal LV chamber size and wall motion, Diffuse nonocclusive CAD     DIAGNOSTIC CARDIAC CATH LAB PROCEDURE  4/11/06    EF over 60%  Normal LV function and hemodynamics, Severe two- vessel CAD , Successful percutaneous coronary intevention w/cuttng balloon angioplasty to a small second circumflex marginal branch and primary stent placement using a drug eluting stent to the diagonal branch of the Age of Onset    Hypertension Mother     Cancer Father     Diabetes Brother           SOCIAL HISTORY       Social History     Socioeconomic History    Marital status:      Spouse name: Marilee Callahan    Number of children: 1    Years of education: 8th grade    Highest education level: None   Occupational History    Occupation: [employer]   Social Needs    Financial resource strain: None    Food insecurity     Worry: None     Inability: None    Transportation needs     Medical: None     Non-medical: None   Tobacco Use    Smoking status: Former Smoker     Packs/day: 2.00     Years: 40.00     Pack years: 80.00     Last attempt to quit: 1989     Years since quittin.6    Smokeless tobacco: Former User     Types: Chew   Substance and Sexual Activity    Alcohol use: No    Drug use: No    Sexual activity: Never   Lifestyle    Physical activity     Days per week: None     Minutes per session: None    Stress: None   Relationships    Social connections     Talks on phone: None     Gets together: None     Attends Hinduism service: None     Active member of club or organization: None     Attends meetings of clubs or organizations: None     Relationship status: None    Intimate partner violence     Fear of current or ex partner: None     Emotionally abused: None     Physically abused: None     Forced sexual activity: None   Other Topics Concern    None   Social History Narrative     58 years only marriage    He has 1 daughter    Retired [employer]    Never in the [employer] eighth grade    He does continue to drive an automobile    Walks poorly in recent years    Smoked previously 2 packs/day quit in  [facility] denies alcohol consumption or substance usage       SCREENINGS    Rawlings Coma Scale  Eye Opening: Spontaneous  Best Verbal Response: Oriented  Best Motor Response: Obeys commands  Efrain Coma Scale Score: 15        PHYSICAL EXAM    (up to 7 for administration in time range)   sodium chloride flush 0.9 % injection 10 mL (has no administration in time range)   acetaminophen (TYLENOL) tablet 650 mg (has no administration in time range)   aspirin EC tablet 81 mg (81 mg Oral Not Given 3/21/20 1533)   atorvastatin (LIPITOR) tablet 20 mg (20 mg Oral Given 3/21/20 2009)   citalopram (CELEXA) tablet 40 mg (40 mg Oral Given 3/21/20 1531)   finasteride (PROSCAR) tablet 5 mg (5 mg Oral Given 3/21/20 1531)   fluticasone (FLONASE) 50 MCG/ACT nasal spray 2 spray (2 sprays Nasal Not Given 3/21/20 1534)   lisinopril (PRINIVIL;ZESTRIL) tablet 10 mg (10 mg Oral Given 3/21/20 1531)   0.9 % sodium chloride infusion ( Intravenous New Bag 3/21/20 1533)   sodium chloride flush 0.9 % injection 10 mL (has no administration in time range)   sodium chloride flush 0.9 % injection 10 mL (has no administration in time range)   ondansetron (ZOFRAN) injection 4 mg (has no administration in time range)   ALPRAZolam (XANAX) tablet 0.5 mg (has no administration in time range)       EMERGENCY DEPARTMENT COURSE and DIFFERENTIALDIAGNOSIS/MDM:   Vitals:    Vitals:    03/21/20 1419 03/21/20 1439 03/21/20 1520 03/21/20 1819   BP: (!) 159/92   (!) 156/73   Pulse: 54 56  79   Resp: 20   18   Temp: 96.7 °F (35.9 °C)   97.8 °F (36.6 °C)   TempSrc: Temporal   Temporal   SpO2: 96%   93%   Weight:   208 lb 6.4 oz (94.5 kg)    Height:   6' (1.829 m)        MDM    ED Course as of Mar 21 2017   Sat Mar 21, 2020   1223 Recent stress test did show EKG changes of ST depression concerning for myocardial ischemia but did not show any clinical or echocardiographic signs of ischemia. Recent echo showed ejection fraction greater than 50%.   Recent ZIO patch data discussed in cardiology nurse practitioner's note showed lowest heart rate of 25, highest heart rate of 190s, and average heart rate of around 52 with signs of intermittent second-degree heart block    [YOUNG]   1325 Discussed with cardiology on-call who agrees based on recent work-up with ischemic findings on stress test and with symptoms concerning for potential cardiac etiology of near syncope patient would benefit from admission for further work-up including heart catheterization and evaluation for potential pacemaker. [YOUNG]      ED Course User Index  [YOUNG] Lossie Severs., MD   Based on the evaluation and work-up here patient is felt to require further monitoring, work-up, or treatment that is available in the emergency department. Case was discussed with cardiology who agrees for observation or admission for further management. Treatment and stabilization as necessary were provided in the emergency department prior to transfer of care to the cardiologyservice. CONSULTS:  None    PROCEDURES:  Unless otherwise notedbelow, none     Procedures      FINAL IMPRESSION     1. Near syncope    2. Second degree heart block by electrocardiogram (ECG)    3. Abnormal stress test    4. History of coronary artery disease    5.  Anticoagulated on Coumadin          DISPOSITION/PLAN   DISPOSITION        PATIENT REFERRED DO Miguel Henson [facility] [place]  [phone]            DISCHARGE MEDICATIONS:  Current Discharge Medication List             (Please note that portions of this note were completed with a voice recognition program.  Efforts were made to edit the dictations butoccasionally words are mis-transcribed.)    Lossie Severs, MD (electronically signed)  AttendingEmergency Physician          Lossie Severs., MD  03/21/20 2017

## 2020-03-21 NOTE — PROGRESS NOTES
Patient unable to remember home medications, patient states his wife takes care of his medications for him. Patient wife, Kaden Still called at this time to verify patient's home medications. Medication list verified and completed.   Electronically signed by Rena Foy RN on 3/21/2020 at 5:44 PM

## 2020-03-22 NOTE — PROGRESS NOTES
Patient's wife Moses Templeton is instructed on coming into the hospital for patient's heart catherization that is planned for today. She will be staying until patient is discharged.  Clinical house made aware

## 2020-03-22 NOTE — PROGRESS NOTES
Telemetry called to report 1st degree Heart block with lengthened W[place] interval of 0.41 sec. Pt denies any discomfort or distress. Will continue to monitor.  Electronically signed by Jose Jones RN on 3/21/2020 at 11:12 PM

## 2020-03-22 NOTE — PROGRESS NOTES
Cardiac catheterization completed right radial artery approach tolerated well. Left ventricular function normal.  Severe 95% plus stenosis origin of a second marginal branch and long diffuse 80% stenosis in the proximal to mid right coronary artery LAD had minimal disease. Underwent successful intervention of the second OM with PTCA 2.0 followed by stent deployment 2.25 x 15 good angiographic results  Planned staged intervention of the right coronary artery in 48 to 72 hours and will likely need pacemaker thereafter.

## 2020-03-23 PROBLEM — E44.0 MODERATE MALNUTRITION (HCC): Status: ACTIVE | Noted: 2020-01-01

## 2020-03-23 NOTE — PLAN OF CARE
Problem: Falls - Risk of:  Goal: Will remain free from falls  Description: Will remain free from falls  3/23/2020 1220 by Chris Woodward RN  Outcome: Ongoing     Problem: Falls - Risk of:  Goal: Absence of physical injury  Description: Absence of physical injury  3/23/2020 1220 by Chris Woodward RN  Outcome: Ongoing     Problem: Activity:  Goal: Risk for activity intolerance will decrease  Description: Risk for activity intolerance will decrease  3/23/2020 1220 by Chris Woodward RN  Outcome: Ongoing     Problem: Activity:  Goal: Will verbalize the importance of balancing activity with adequate rest periods  Description: Will verbalize the importance of balancing activity with adequate rest periods  3/23/2020 1220 by Chris Woodward RN  Outcome: Ongoing     Problem:  Activity:  Goal: Ability to tolerate increased activity will improve  Description: Ability to tolerate increased activity will improve  3/23/2020 1220 by Chris Woodward RN  Outcome: Ongoing     Problem: Cardiac:  Goal: Ability to maintain an adequate cardiac output will improve  Description: Ability to maintain an adequate cardiac output will improve  3/23/2020 1220 by Chris Woodward RN  Outcome: Ongoing     Problem: Cardiac:  Goal: Hemodynamic stability will improve  Description: Hemodynamic stability will improve  3/23/2020 1220 by Chris Woodward RN  Outcome: Ongoing     Problem: Cardiac:  Goal: Diagnostic test results will improve  Description: Diagnostic test results will improve  3/23/2020 1220 by Chris Woodward RN  Outcome: Ongoing     Problem: Tissue Perfusion - Cardiopulmonary, Altered:  Goal: Absence of angina  Description: Absence of angina  3/23/2020 1220 by Chris Woodward RN  Outcome: Ongoing

## 2020-03-23 NOTE — PROGRESS NOTES
Cardiology Daily Note Laquita Ceja MD      Patient:  Isabel Irwin  [id number]    Patient Active Problem List    Diagnosis Date Noted    Abnormal stress test      Priority: High    Bradycardia 03/21/2020     Priority: High    Near syncope 03/21/2020     Priority: Low    Benign prostatic hyperplasia with urinary hesitancy 08/01/2018     Priority: Low    S/P coronary artery stent placement 06/20/2017     Priority: Low    Anxiety      Priority: Low    Insomnia      Priority: Low    Hypertension      Priority: Low    Vertigo 02/10/2014     Priority: Low    Gastroesophageal reflux disease      Priority: Low    CAD (coronary artery disease)      Priority: Low    SVT (supraventricular tachycardia) (HCC)      Priority: Low    Hyperlipidemia      Priority: Low    Coronary artery disease 07/18/2011     Priority: Low    Supraventricular tachycardia ([address] 07/18/2011     Priority: Low       Admit Date:  3/21/2020    Admission Problem List: Present on Admission:   Near syncope   Bradycardia   Abnormal stress test      Cardiac Specific Data:  Specialty Problems        Cardiology Problems    Bradycardia        Coronary artery disease        Supraventricular tachycardia (HCC)        CAD (coronary artery disease)        Hyperlipidemia        SVT (supraventricular tachycardia) ([address]        Hypertension        Near syncope              Subjective:  Mr. Elizabeth Campo seen today underwent intervention of the second OM yesterday stent placement. Today doing well denies chest pain denies dyspnea no other complaints. Objective:   BP (!) 148/68   Pulse 52   Temp 97.9 °F (36.6 °C) (Temporal)   Resp 18   Ht 6' (1.829 m)   Wt 209 lb 3.2 oz (94.9 kg)   SpO2 95%   BMI 28.37 kg/m²       Intake/Output Summary (Last 24 hours) at 3/23/2020 1047  Last data filed at 3/23/2020 0950  Gross per 24 hour   Intake 940 ml   Output 875 ml   Net 65 ml       Prior to Admission medications    Medication Sig Start Date End Date Taking?

## 2020-03-25 NOTE — PROGRESS NOTES
Cardiology Daily Note Murry Schaumann, MD      Patient:  Pino Leonardo  [id number]    Patient Active Problem List    Diagnosis Date Noted    Abnormal stress test      Priority: High    Bradycardia 03/21/2020     Priority: High    Moderate malnutrition ([address] 03/23/2020     Priority: Low    Near syncope 03/21/2020     Priority: Low    Benign prostatic hyperplasia with urinary hesitancy 08/01/2018     Priority: Low    S/P coronary artery stent placement 06/20/2017     Priority: Low    Anxiety      Priority: Low    Insomnia      Priority: Low    Hypertension      Priority: Low    Vertigo 02/10/2014     Priority: Low    Gastroesophageal reflux disease      Priority: Low    CAD (coronary artery disease)      Priority: Low    SVT (supraventricular tachycardia) (HCC)      Priority: Low    Hyperlipidemia      Priority: Low    Coronary artery disease 07/18/2011     Priority: Low    Supraventricular tachycardia ([address] 07/18/2011     Priority: Low       Admit Date:  3/21/2020    Admission Problem List: Present on Admission:   Near syncope   Bradycardia   Abnormal stress test   Moderate malnutrition (HCC)      Cardiac Specific Data:  Specialty Problems        Cardiology Problems    Bradycardia        Coronary artery disease        Supraventricular tachycardia (HCC)        CAD (coronary artery disease)        Hyperlipidemia        SVT (supraventricular tachycardia) ([address]        Hypertension        Near syncope              Subjective:  Mr. Maggi Brown seen today resting comfortably denies chest pain. Had complex intervention right coronary artery 3/24/2020 with 3 stents placed. Intermittent bradycardia in the 40s and 50s symptomatic with recent episodes of syncope. Pacemaker recommended.     Objective:   /63   Pulse 61   Temp 97.3 °F (36.3 °C) (Temporal)   Resp 18   Ht 6' (1.829 m)   Wt 208 lb 6.4 oz (94.5 kg)   SpO2 90%   BMI 28.26 kg/m²       Intake/Output Summary (Last 24 hours) at 3/25/2020

## 2020-03-25 NOTE — PROGRESS NOTES
Nutrition Assessment    Type and Reason for Visit: Reassess    Nutrition Recommendations: follow for diet advancement    Nutrition Assessment: Pt remains moderately malnourished. Pt NPO at time of visit today. PO intake is fairly good at 50-75%. weight stable    Malnutrition Assessment:  · Malnutrition Status: Meets the criteria for moderate malnutrition  · Context: Acute illness or injury  · Findings of the 6 clinical characteristics of malnutrition (Minimum of 2 out of 6 clinical characteristics is required to make the diagnosis of moderate or severe Protein Calorie Malnutrition based on AND/ASPEN Guidelines):  1. Energy Intake-Less than or equal to 75% of estimated energy requirement, Greater than or equal to 1 month    2. Weight Loss-2% loss or greater, in 1 week  3. Fat Loss-Unable to assess,    4. Muscle Loss-Mild muscle mass loss, Clavicles (pectoralis and deltoids)  5. Fluid Accumulation-No significant fluid accumulation,    6.  Strength-Not measured    Nutrition Risk Level: Moderate    Nutrient Needs:  · Estimated Daily Total Kcal: 6448-1388(20-25 kcal/kg)  · Estimated Daily Protein (g): (1.2-1.5 g/kg)  · Estimated Daily Total Fluid (ml/day): 9009-3396    Nutrition Diagnosis:   · Problem:  Moderate malnutrition, In context of acute illness or injury  · Etiology: related to Insufficient energy/nutrient consumption     Signs and symptoms:  as evidenced by NPO status due to medical condition, Diet history of poor intake, Weight loss 1-2% in 1 week, Mild muscle loss    Objective Information:  · Nutrition-Focused Physical Findings:    · Wound Type: None  · Current Nutrition Therapies:  · Oral Diet Orders: NPO   · Oral Diet intake: NPO, 51-75%  · Oral Nutrition Supplement (ONS) Orders: None    · Anthropometric Measures:  · Ht: 6' (182.9 cm)   · Current Body Wt: 208 lb 6 oz (94.5 kg)  · Admission Body Wt: 208 lb (94.3 kg)  · Usual Body Wt: (225-230#, stated)  · % Weight Change:  ,  2.7% loss past

## 2020-03-25 NOTE — PROCEDURES
Cardiac Dual Chamber Insert - [id number]  Placement Operative Report    Lazaro Walsh  [id number]  3/25/2020    Surgeon: Carloz Gracia    Anesthesia: Intravenous sedation and local anesthetic    Procedure(s):   1. Dual Chamber Insert - [id number]  2. Monitoring of conscious sedation      Indications:  1. Sinus node dysfunction with intermittent bradycardia and syncope  2. Paroxysmal atrial fibrillation    Procedure Details  The risks, benefits, complications, treatment options, and expected outcomes were discussed with the patient. The patient and/or family concurred with the proposed plan, giving informed consent. Patient was prepped and draped in the usual strict sterile fashion. After the antibiotic was completely infused, 30 cc 2% xylocaine was infiltrated into the left Infraclavicular area. Venous access obtained utilizing a micropuncture needle under ultrasonographic guidance and the micropuncture wire was advanced followed by the dilator. The wire was then exchanged for a standard 0.0.35 wire and then the dilator was removed. Next, an incision was made adjacent to the wire entry site. Utilizing sharp and blunt dissection a pocket was then created down to the prepectoralis fascia. The guidewire was identified, freed from the surrounding subcutaneous tissue, and delivered into the operative field. Next an 7french sheath and dilator was advanced over the wire then the dilator was removed. Another similar wire was advanced into the sheath then the sheath was removed leaving two wires in place in the subclavian vein. Next, the 7french sheath and dilators were advanced over their respective wires into the subclavian vein. The dilator and wires were then removed. Next the atrial and ventricular leads were inserted into their respective sheaths   And advanced into the right atrium and ventricle where the leads were positioned under fluoroscopic guidance. The sheaths were peeled away and removed.  Appropriate sensing and the procedure well.            Disposition: Admitted to progressive care unit           Condition: stable      Gris Ervin MD 3/25/2020 1:33 PM

## 2020-03-25 NOTE — PLAN OF CARE
Nutrition Problem:  Moderate malnutrition, In context of acute illness or injury  Intervention: Food and/or Nutrient Delivery: Continue NPO  Nutritional Goals: PO 50% or more, stable wt

## 2020-03-25 NOTE — PLAN OF CARE
Problem: Falls - Risk of:  Goal: Will remain free from falls  Description: Will remain free from falls  3/25/2020 0023 by Ana Garcia RN  Outcome: Ongoing  3/24/2020 1912 by Nay Madrid RN  Outcome: Ongoing  Goal: Absence of physical injury  Description: Absence of physical injury  3/25/2020 0023 by Ana Garcia RN  Outcome: Ongoing  3/24/2020 1912 by Nay Madrid RN  Outcome: Ongoing     Problem:  Activity:  Goal: Risk for activity intolerance will decrease  Description: Risk for activity intolerance will decrease  3/25/2020 0023 by Ana Garcia RN  Outcome: Ongoing  3/24/2020 1912 by Nay Madrid RN  Outcome: Ongoing  Goal: Will verbalize the importance of balancing activity with adequate rest periods  Description: Will verbalize the importance of balancing activity with adequate rest periods  3/25/2020 0023 by Ana Garcia RN  Outcome: Ongoing  3/24/2020 1912 by Nay Madrid RN  Outcome: Ongoing  Goal: Ability to tolerate increased activity will improve  Description: Ability to tolerate increased activity will improve  3/25/2020 0023 by Ana Garcia RN  Outcome: Ongoing  3/24/2020 1912 by Nay Madrid RN  Outcome: Ongoing     Problem: Cardiac:  Goal: Ability to maintain an adequate cardiac output will improve  Description: Ability to maintain an adequate cardiac output will improve  3/25/2020 0023 by Ana Garcia RN  Outcome: Ongoing  3/24/2020 1912 by Nay Madrid RN  Outcome: Ongoing  Goal: Hemodynamic stability will improve  Description: Hemodynamic stability will improve  3/25/2020 0023 by Ana Garcia RN  Outcome: Ongoing  3/24/2020 1912 by Nay Madrid RN  Outcome: Ongoing  Goal: Diagnostic test results will improve  Description: Diagnostic test results will improve  3/25/2020 0023 by Ana Garcia RN  Outcome: Ongoing  3/24/2020 1912 by Nay Madrid RN  Outcome: Ongoing     Problem: Tissue Perfusion - Cardiopulmonary, Altered:  Goal: Absence of angina  Description: Absence of angina  3/25/2020 0023 by Jorge Mann RN  Outcome: Ongoing  3/24/2020 1912 by Rose Light, RN  Outcome: Ongoing     Problem: Nutrition  Goal: Optimal nutrition therapy  Description: Nutrition Problem:  Moderate malnutrition, In context of acute illness or injury  Intervention: Food and/or Nutrient Delivery: Continue current diet  Nutritional Goals: PO 50% or more, stable wt     3/25/2020 0023 by Jorge Mann RN  Outcome: Ongoing  3/24/2020 1912 by Rose Light RN  Outcome: Ongoing

## 2020-03-26 NOTE — PROGRESS NOTES
Patient removed his sling. This nurse advised the patient to put the sling back on. The patient refused the sling when he was offered help to put it back on.

## 2020-03-26 NOTE — DISCHARGE SUMMARY
Discharge Summary    Jeffrey Gtz  :  1940  MRN:  [id number]    Admit date:  3/21/2020  Discharge date:      Admitting Physician:  Skyler Jimenez MD    Advance Directive: Full Code    Consults: none    Primary Care Physician:  Yudelka Garsia DO    Discharge Diagnoses: Active Problems:    Bradycardia    Abnormal stress test    Near syncope    Moderate malnutrition (HCC)  Resolved Problems:    * No resolved hospital problems. *      Cardiology Specific Data:  Specialty Problems        Cardiology Problems    Bradycardia        Coronary artery disease        Supraventricular tachycardia (HCC)        CAD (coronary artery disease)        Hyperlipidemia        SVT (supraventricular tachycardia) (HCC)        Hypertension        Near syncope              Significant Diagnostic Studies:   Xr Chest Standard (2 Vw)    Result Date: 3/21/2020  Exam:   XR CHEST (2 VW)  Date:  3/21/2020 History:  Male, age  78 years; near syncope COMPARISON:  Chest x-ray dated . Findings : Borderline size of the cardiomediastinal silhouette, without convincing evidence of fluid overload. Chronic interstitial lung changes. Lungs are without focal infiltrate, mass or effusions. The bones show no acute pathology. Impression: No acute cardiopulmonary disease. Signed by Dr Claudeen North on 3/21/2020 12:36 PM      Pertinent Labs:   CBC:   Recent Labs     20   WBC 5.6 6.2   HGB 11.3* 11.5*    182     BMP:    Recent Labs     207 20  0913   * 141 141   K 4.6 3.3* 3.1*    105 105   CO2 19* 23 26   BUN 12 12 10   CREATININE 0.6 0.5 0.6   GLUCOSE 169* 90 143*     INR:   Recent Labs     20   INR 1.25*     Lipids:   Recent Labs     20  1128   CHOL 113*   HDL 43*     ABGs:No results for input(s): PHART, MJH2ZWD, PO2ART, CXE1VEA, BEART, HGBAE, V1TAHULW, CARBOXHGBART, 02THERAPY in the last 72 hours.   HgBA1c:  No results for input(s): LABA1C in the last 72 hours. Procedures: Dual-chamber pacemaker implantation 3/25/2020, percutaneous core intervention  with stent placement x3 right coronary artery 3/24/2020, cardiac catheterization with percutaneous intervention obtuse marginal 3 on 8/2/2020    Hospital Course: Admitted with syncopal episode 3/21/2020 and recent abnormal stress test also complains of dyspnea. Admitted to the hospital.  Troponins negative. Underwent cardiac catheterization the next day with severe disease in the right coronary artery and obtuse marginal underwent on that particular day PTCA and stent placement second obtuse marginal with good results. Brought to the Cath Lab 24 2020 with percutaneous intervention and stent placement x3 in the right coronary artery good results. Patient was noted to have intermittent bradycardia in the 40s and 50s and on a recent event recorder had had rates as low as 25. It was felt that permanent pacemaker implantation was warranted. Brought to the Cath Lab on 325 where dual-chamber pacemaker implanted left infraclavicular approach tolerated well. He was in atrial fibrillation at that time and remains in atrial fibrillation at the present time. Today is wound is healing appropriately no hematoma I personally gave him wound care instructions. Postprocedural chest x-ray satisfactory. Device interrogation satisfactory he has good sensing on the atrial lead 2.5 mV P waves but cannot determine threshold voltage due to the fact that he is in atrial fibrillation. At this time plan is for discharge today follow-up in the office for wound care check a week from Monday. If stable at that time can institute anticoagulation with either warfarin or Eliquis and follow-up thereafter 2 weeks later and if persistently in atrial fibrillation we will consider cardioversion at that time. At this time he is discharged in stable condition.     Physical Exam:    Vital Signs: /68   Pulse 77   Temp 97.7 °F (36.5 °C) (Temporal)   Resp 18   Ht 6' (1.829 m)   Wt 211 lb 9.6 oz (96 kg)   SpO2 94%   BMI 28.70 kg/m²     Physical Exam      Discharge Medications:       Anurag Gallardo   Home Medication Instructions [id number]    Printed on:03/26/20 2089   Medication Information                      aspirin 81 MG EC tablet  Take 81 mg by mouth daily. aspirin 81 MG EC tablet  Take 1 tablet by mouth daily             atorvastatin (LIPITOR) 80 MG tablet  Take 1 tablet by mouth nightly             citalopram (CELEXA) 40 MG tablet  Take 1 tablet by mouth daily             clopidogrel (PLAVIX) 75 MG tablet  Take 1 tablet by mouth daily             Docusate Sodium (EQ STOOL SOFTENER PO)  Take 100 mg by mouth 2 times daily             finasteride (PROSCAR) 5 MG tablet  Take 1 tablet by mouth daily             fluticasone (FLONASE) 50 MCG/ACT nasal spray  2 sprays by Nasal route daily             lisinopril (PRINIVIL;ZESTRIL) 10 MG tablet  Take 1 tablet by mouth daily             Multiple Vitamins-Minerals (EYE VITAMINS PO)  Take 2 tablets by mouth daily              omeprazole (PRILOSEC) 40 MG delayed release capsule  TAKE 1 CAPSULE BY MOUTH ONCE DAILY                 Discharge InstructionsMary Jo Lemus DO  [address]          [facility]  [facility], [address] [address] [phone]  On 4/3/2020  10:15 am wound check     Komal Green MD  [address] [facility]  [phone]    On 4/27/2020  2:30 pm         Take medications as directed. Resume activity as tolerated. Diet: DIET CARDIAC;      Disposition: Patient is medically stable and will be discharged *    Julio César Ram MD, 3/26/2020 11:19 AM

## 2020-03-26 NOTE — PROGRESS NOTES
Cardiology Daily Note Kiersten Tillman MD      Patient:  Aleksander Hidalgo  [id number]    Patient Active Problem List    Diagnosis Date Noted    Abnormal stress test      Priority: High    Bradycardia 03/21/2020     Priority: High    Moderate malnutrition ([address] 03/23/2020     Priority: Low    Near syncope 03/21/2020     Priority: Low    Benign prostatic hyperplasia with urinary hesitancy 08/01/2018     Priority: Low    S/P coronary artery stent placement 06/20/2017     Priority: Low    Anxiety      Priority: Low    Insomnia      Priority: Low    Hypertension      Priority: Low    Vertigo 02/10/2014     Priority: Low    Gastroesophageal reflux disease      Priority: Low    CAD (coronary artery disease)      Priority: Low    SVT (supraventricular tachycardia) (HCC)      Priority: Low    Hyperlipidemia      Priority: Low    Coronary artery disease 07/18/2011     Priority: Low    Supraventricular tachycardia ([address] 07/18/2011     Priority: Low       Admit Date:  3/21/2020    Admission Problem List: Present on Admission:   Near syncope   Bradycardia   Abnormal stress test   Moderate malnutrition (HCC)      Cardiac Specific Data:  Specialty Problems        Cardiology Problems    Bradycardia        Coronary artery disease        Supraventricular tachycardia (HCC)        CAD (coronary artery disease)        Hyperlipidemia        SVT (supraventricular tachycardia) ([address]        Hypertension        Near syncope              Subjective:  Mr. Betina Zamudio seen today underwent dual-chamber pacemaker implantation yesterday tolerated well. Today the wound is healing appropriately. Postprocedural chest x-ray no evidence of complications. Device check today functioning appropriately. He is in atrial fibrillation therefore threshold voltage cannot be determined but his P waves were 2.5 and function is otherwise satisfactory.   Blood pressure stable 156/78 we will have him ambulate this morning at this point he is not 3/21/2020 12:36 PM    Xr Chest Portable    Result Date: 3/25/2020  Exam:   XR CHEST PORTABLE  Date:  3/25/2020 History:  Male, age  78 years; cardiac device placement. COMPARISON:  Chest x-ray dated 3/21/2020 Findings : There is a new left-sided cardiac device with leads project over the right ventricle and right atrium. The right atrial lead is poorly visualized distally due to poor penetration related to patient body habitus and technique. There is no measurable pneumothorax. No new pleural effusion. Chronic interstitial lung changes. The heart and mediastinum are unchanged in size. The bones show no acute pathology. Impression: New left-sided cardiac device without measurable pneumothorax or new pleural effusion. Signed by Dr Erasto Wynn on 3/25/2020 2:36 PM    Vl Dup Upper Extremity Venous Left    Result Date: 3/25/2020  Vascular Procedure  Demographics   Patient Name     Justin Rashid Age                    78   Patient Number   [id number]          Gender                 Male   Visit Number     [id number]       Interpreting Physician Saima Goyal MD   Date of Birth    1940      Referring Physician    Shanika Hidalgo MD   Accession Number [id number]       [address]  Procedure Type of Study:   Miscellaneous  Indications for Study:Pacemaker and Ultrasound guidance. Risk Factors   - The patient's last creatinine was 0.5 mg/dl. Allergies   - Penicillins. Impression   Successful cannulation left subclavian vein with duplex/ultrasound  guidance for pacemaker placement. Signature   ----------------------------------------------------------------  Electronically signed by Saima Goyal MD(Interpreting  physician) on 03/25/2020 12:36 PM  ----------------------------------------------------------------          Assessment:  1. Near syncopal episode  2. Bradycardia  3. Hypertension  4. Prior tobacco abuse discontinued 1989  5. Coronary artery disease  6.  History of multiple heart caths in the past with several stents previously placed remotely  7. Recent abnormal stress test 3/16/2020 with EKG changes  8. Recent abnormal ZIO Patch with heart rates as low as 25  9. Chronic anticoagulation with warfarin  10. History of SVT  11. Gastroesophageal reflux disease  12. Hyperlipidemia  13. Vertigo  14. Anxiety  15. Benign prostatic hyperplasia  16. Status post complex intervention right coronary artery 3 stents placed 3/24/2020  17. Status post dual-chamber pacemaker placement 3/25/2020    Plan:  1. Stable from a cardiac standpoint can be released today I will see back in the office a week from Monday. At that point we can start him on anticoagulation but defer until after wound check. 2. After anticoagulation is initiated we will see back thereafter about 2 weeks later and if he is persistently in atrial fibrillation at that point we will discuss possible cardioversion.     Sommer Sheehan MD 3/26/2020 8:57 AM

## 2020-03-26 NOTE — PLAN OF CARE
Problem: Falls - Risk of:  Goal: Will remain free from falls  Description: Will remain free from falls  Outcome: Ongoing  Goal: Absence of physical injury  Description: Absence of physical injury  Outcome: Ongoing     Problem: Activity:  Goal: Risk for activity intolerance will decrease  Description: Risk for activity intolerance will decrease  Outcome: Ongoing  Goal: Will verbalize the importance of balancing activity with adequate rest periods  Description: Will verbalize the importance of balancing activity with adequate rest periods  Outcome: Ongoing  Goal: Ability to tolerate increased activity will improve  Description: Ability to tolerate increased activity will improve  Outcome: Ongoing     Problem: Cardiac:  Goal: Ability to maintain an adequate cardiac output will improve  Description: Ability to maintain an adequate cardiac output will improve  Outcome: Ongoing  Goal: Hemodynamic stability will improve  Description: Hemodynamic stability will improve  Outcome: Ongoing  Goal: Diagnostic test results will improve  Description: Diagnostic test results will improve  Outcome: Ongoing     Problem: Tissue Perfusion - Cardiopulmonary, Altered:  Goal: Absence of angina  Description: Absence of angina  Outcome: Ongoing     Problem: Nutrition  Goal: Optimal nutrition therapy  Description: Nutrition Problem:  Moderate malnutrition, In context of acute illness or injury  Intervention: Food and/or Nutrient Delivery: Continue current diet  Nutritional Goals: PO 50% or more, stable wt     Outcome: Ongoing     Problem: Pain:  Goal: Pain level will decrease  Description: Pain level will decrease  Outcome: Ongoing  Goal: Control of acute pain  Description: Control of acute pain  Outcome: Ongoing  Goal: Control of chronic pain  Description: Control of chronic pain  Outcome: Ongoing

## 2020-03-27 NOTE — CONSULTS
Patient was discharged prior to MI/PTCA/STENT TEACHING being conducted. Cardiac Rehab education packet was sent to the patient's address on record. Handouts included were titled; \"Home Instructions Following a Cardiac Event\", \"Cardiac Home Exercise Program - Phase I\", \"Risk Factors for Heart Disease and Stroke\" and \"Cardiac Diet/Low Cholesterol\". Patient will be contacted with the opportunity to enroll in Phase II Outpatient Cardiac Rehab after COVID-19 temporary suspension of services has been lifted.

## 2020-04-01 NOTE — PROGRESS NOTES
Bilirubin 0.5 0.2 - 1.2 mg/dL    Alkaline Phosphatase 89 40 - 130 U/L    ALT 12 5 - 41 U/L    AST 25 5 - 40 U/L   Protime-INR   Result Value Ref Range    Protime 15.7 (H) 12.0 - 14.6 sec    INR 1.25 (H) 0.88 - 1.18   Magnesium   Result Value Ref Range    Magnesium 2.0 1.6 - 2.4 mg/dL   Basic Metabolic Panel   Result Value Ref Range    Sodium 141 136 - 145 mmol/L    Potassium 3.1 (L) 3.5 - 5.0 mmol/L    Chloride 105 98 - 111 mmol/L    CO2 26 22 - 29 mmol/L    Anion Gap 10 7 - 19 mmol/L    Glucose 143 (H) 74 - 109 mg/dL    BUN 10 8 - 23 mg/dL    CREATININE 0.6 0.5 - 1.2 mg/dL    GFR Non-African American >60 >60    Calcium 8.5 (L) 8.8 - 10.2 mg/dL   EKG 12 Lead   Result Value Ref Range    P-R Interval  ms    QRS Duration 74 ms    Q-T Interval 500 ms    QTc Calculation (Bazett) 497 ms    P Axis  degrees    T Axis 34 degrees   EKG 12 lead   Result Value Ref Range    P-R Interval  ms    QRS Duration 84 ms    Q-T Interval 550 ms    QTc Calculation (Bazett) 531 ms    P Axis  degrees    T Axis 34 degrees   Electrocardiogram, 12-lead    Result Value Ref Range    P-R Interval  ms    QRS Duration 82 ms    Q-T Interval 502 ms    QTc Calculation (Bazett) 480 ms    P Axis  degrees    T Axis 48 degrees   EKG 12 lead   Result Value Ref Range    P-R Interval  ms    QRS Duration 82 ms    Q-T Interval 490 ms    QTc Calculation (Bazett) 490 ms    P Axis  degrees    T Axis 43 degrees   EKG 12 lead   Result Value Ref Range    P-R Interval  ms    QRS Duration 156 ms    Q-T Interval 494 ms    QTc Calculation (Bazett) 495 ms    P Axis  degrees    T Axis 45 degrees     have reviewed the following with the Mr. Román Melton   Lab Review   No results displayed because visit has over 200 results.       Hospital Outpatient Visit on 03/16/2020   Component Date Value    Left Ventricular Ejectio* 03/16/2020 58     LVEF MODALITY 03/16/2020 ECHO    Orders Only on 03/02/2020   Component Date Value    Protime 03/02/2020 28.6*    INR 03/02/2020 2.61* Copies of these are in the chart. Prior to Visit Medications    Medication Sig Taking? Authorizing Provider   aspirin 81 MG EC tablet Take 1 tablet by mouth daily  Rodriguez Boykin MD   atorvastatin (LIPITOR) 80 MG tablet Take 1 tablet by mouth nightly  Rodriguez Boykin MD   clopidogrel (PLAVIX) 75 MG tablet Take 1 tablet by mouth daily  Rodriguez Boykin MD   fluticasone (FLONASE) 50 MCG/ACT nasal spray 2 sprays by Nasal route daily  CONNOR Bowles   finasteride (PROSCAR) 5 MG tablet Take 1 tablet by mouth daily  Patient taking differently: Take 5 mg by mouth nightly   B Ayde Varela, DO   Docusate Sodium (EQ STOOL SOFTENER PO) Take 100 mg by mouth 2 times daily  Historical Provider, MD   omeprazole (PRILOSEC) 40 MG delayed release capsule TAKE 1 CAPSULE BY MOUTH ONCE DAILY  B Rubdeandra Varela, DO   lisinopril (PRINIVIL;ZESTRIL) 10 MG tablet Take 1 tablet by mouth daily  Patient taking differently: Take 10 mg by mouth nightly   B Rubye Nichole, DO   citalopram (CELEXA) 40 MG tablet Take 1 tablet by mouth daily  Patient taking differently: Take 40 mg by mouth nightly   B Rubye Nichole, DO   Multiple Vitamins-Minerals (EYE VITAMINS PO) Take 2 tablets by mouth daily   Historical Provider, MD   aspirin 81 MG EC tablet Take 81 mg by mouth daily. Historical Provider, MD       Allergies: Pcn [penicillins]    Past Medical History:   Diagnosis Date    Anxiety     Arthritis     CAD (coronary artery disease)     Gastroesophageal reflux disease     Hyperlipidemia     Cholesterol management per pcpEDITH M.D. Hypertension     Insomnia     Mitral valve disorder     Pancreatitis     [facility])        Past Surgical History:   Procedure Laterality Date    A-V CARDIAC PACEMAKER INSERTION  03/25/2020    SA node dysfunction-Dr. Lebron Feliciano APPENDECTOMY      CARDIAC CATHETERIZATION  10/3/11    CARDIAC CATHETERIZATION  9/1/1987    Normal left ventricular function and hemodynamics , Normal coronary arteriograms    CHOLECYSTECTOMY      COLONOSCOPY      CORONARY ANGIOPLASTY WITH STENT PLACEMENT      DIAGNOSTIC CARDIAC CATH LAB PROCEDURE  3/24/10    EF over 60%  Normal left ventricular function and hemodynamics, Diffuse nonocclusive CAD , w/ a patent stent in the circumflex marginal branch    DIAGNOSTIC CARDIAC CATH LAB PROCEDURE  10/28/08    EF 60% Normal LV function and hemodynamics, Diffuse non-occlusive CAD     DIAGNOSTIC CARDIAC CATH LAB PROCEDURE  07    EF over 60% Normal LV chamber size and wall motion, Diffuse nonocclusive CAD     DIAGNOSTIC CARDIAC CATH LAB PROCEDURE  06    EF over 60%  Normal LV function and hemodynamics, Severe two- vessel CAD , Successful percutaneous coronary intevention w/cuttng balloon angioplasty to a small second circumflex marginal branch and primary stent placement using a drug eluting stent to the diagonal branch of the LAD     DIAGNOSTIC CARDIAC CATH LAB PROCEDURE  03    EF over 60% Normal LV function and hemodynamics Mild diffuse nonocclusive CAD w/o hemodynamically significant lesions identified     ENDOSCOPY, COLON, DIAGNOSTIC      EYE SURGERY      FRACTURE SURGERY         Social History     Tobacco Use    Smoking status: Former Smoker     Packs/day: 2.00     Years: 40.00     Pack years: 80.00     Last attempt to quit: 1989     Years since quittin.6    Smokeless tobacco: Former User     Types: Chew   Substance Use Topics    Alcohol use: No       Family History   Problem Relation Age of Onset    Hypertension Mother     Cancer Father     Diabetes Brother        Review of Systems   Constitutional: Positive for fatigue (improving). Negative for activity change (improved), appetite change (much improved) and fever. HENT: Positive for congestion and rhinorrhea. Respiratory: Positive for cough (chronic). Negative for shortness of breath (improved).     Cardiovascular: Positive for patient is advised to bring in medication bottles in order to correctly reconcile with our current list.    Alice Coombs received counseling on the following healthy behaviors: n/a    Patient given educational materials on dx    I have instructed Alice Coombs to complete a self tracking handout on n/a and instructed them to bring it with them to his next appointment. Discussed use, benefit, and side effects of prescribed medications. Barriers to medication compliance addressed. All patient questions answered. Pt voiced understanding.      CONNOR Ulloa

## 2020-04-06 NOTE — TELEPHONE ENCOUNTER
Patients wife reports he just started the coumadin on 4/3/20 so 3 days ago at 5mg daily, please advise

## 2020-04-13 NOTE — TELEPHONE ENCOUNTER
Patient's spouse reports patient is taking 7.5mg daily, please advise what we need to do with his dosage-thanks!

## 2020-04-13 NOTE — PROGRESS NOTES
----- Message from [address] [facility],[address], DO sent at 4/13/2020 11:22 AM CDT -----  Recommend increase Coumadin to 6 mg and recheck in 3 to 4 days. Patient's spouse reports patient is taking 7.5mg daily, please advise what we need to do with his dosage-thanks! Then we need to increase to 8 mg daily  And recheck in 3 days    Called patient, spoke with: Spouse regarding the results of the patients most recent PT/INR. I advised Spouse of Dr. Stanley Likes recommendations.    Spouse did voice understanding    Requested Prescriptions     Signed Prescriptions Disp Refills    warfarin (COUMADIN) 4 MG tablet 30 tablet 0     Sig: Take 2 tablets by mouth daily Recheck PT/INR in 3 days

## 2020-04-17 NOTE — PROGRESS NOTES
Notes recorded by CONNOR Vasquez on 4/17/2020 at 10:28 AM CDT  Please call patient and let them know results. Increase coumadin by 1/2mg daily and repeat protime in 1 week        Tried to call pt and NA and NO VM.   He is currently on 8mg, so will need to do 8.5      Called and got message to pts wife to take 8.5 and recheck in one week

## 2020-04-20 NOTE — TELEPHONE ENCOUNTER
pts wife called this am. She realized when he went to take his meds yesterday afternoon that she had only been giving him 1-4mg, not 2-4mg to equal 8mg. She gave him 8 last night and will do the same again today. She can not bring him in any earlier than Friday for a recheck    Notes recorded by CONNOR Carrasco on 4/17/2020 at 10:28 AM CDT  Please call patient and let them know results. Increase coumadin by 1/2mg daily and repeat protime in 1 week           Tried to call pt and NA and NO VM.   He is currently on 8mg, so will need to do 8.5        Called and got message to pts wife to take 8.5 and recheck in one week

## 2020-04-24 NOTE — TELEPHONE ENCOUNTER
Received fax from pharmacy requesting refill on pts medication(s). Pt was last seen in office on 9/12/2019  and has a follow up scheduled for 5/4/2020. Will send request to  Dr. Sarah Figueroa  for patient.      Requested Prescriptions     Pending Prescriptions Disp Refills    warfarin (COUMADIN) 4 MG tablet [Pharmacy Med Name: Warfarin Sodium 4 MG Oral Tablet] 60 tablet 3     Sig: Take 2 tablets by mouth daily

## 2020-04-27 PROBLEM — Z95.0 PACEMAKER: Status: ACTIVE | Noted: 2020-01-01

## 2020-04-27 NOTE — PROGRESS NOTES
Pacemaker interrogated  Presenting rhythm:  AS , AP 47.3%,  99.4%  Battey voltage 7.1 years  Lead status:  Lead impedance within range and stable  Sensing:  P waves 3.3 mV,  R waves 8.1 mV  Thresholds:  Atrial 0.75V @ 0.4ms, ventricular 0.5@ 0.4ms  Observations:  1 monitored VT on 4/7/20 previously reviewed   Patient has some brief episode of atrial tachycardia with rates around 146.   Decreased mode switch rate to 140 bpm to improve detection of atrial arrhythmias  Reprogramming for sensitivity and threshold testing  [facility]

## 2020-05-04 PROBLEM — I48.0 PAROXYSMAL ATRIAL FIBRILLATION (HCC): Status: ACTIVE | Noted: 2020-01-01

## 2020-05-04 NOTE — PROGRESS NOTES
[address] [facility], [address]  Phone [phone]   Fax [phone]      OFFICE VISIT: 2020    Em Spangler-: 1940      HPI  Reason For Visit:  Grayson Escamilla is a 78 y.o. Follow-up (here for follow up, patient states today is not for a knee injection as he had this with dr Owen Chamorro last week, just a checkup. doing well, no symptoms of covid. recently had 4 stents and a pacemaker placed- dr Yossi Hernández. )    Patient presents with multiple issues on follow-up. Present concerns:  He is wearing down      Osteoarthritis R Knee:  Dr. Ori Paulino put a shot in his knee last week. Not much difference that he has noticed   This is holding him back significantly. Hypertension:   BP today was   BP Readings from Last 1 Encounters:   20 134/82      Recent BP readings:    BP Readings from Last 3 Encounters:   20 134/82   20 100/68   20 132/68     Medication   Lisinopril 10 mg nightly   Metoprolol tartrate has been stopped by cardiology. Medication compliance:  compliant most of the time  Home blood pressure monitoring: No.    He is not adherent to a low sodium diet. Symptoms: none  Laboratory:  Lab Results   Component Value Date    BUN 10 2020    CREATININE 0.6 2020       Hyperlipidemia:   Medication:   atorvastatin (Lipitor)  Low Fat, Low Choleterol Diet:  yes - to some degree  Myalgias or GI upset: no  The patient exercises rarely.   Laboratory:    Lab Results   Component Value Date    CHOL 113 (L) 2020    TRIG 128 2020    HDL 43 (L) 2020    LDLCALC 44 2020      Lab Results   Component Value Date    ALT 12 2020    AST 25 2020       Coronary artery disease:  Medication:              metoprolol tartrate (he states that Dr. Unique Bruno stopped his beta blocker)   Plavix 75 mg daily              Aspirin 81 mg daily  Symptoms:  He recently had pacemaker placemen  Most recent stent placement was about a month ago.        SVT:  Medications:              He is not on any medications for rate suppression. Symptoms: he has occasional palpitations, but none recently.        Paroxysmal atrial fibrillation:  Medications:              Coumadin 8mg nightly  Symptoms:he bruises easily but no other problem. No fluttering. Most recent INR was 2.3 on 4/25/2020        Depression and anxiety:  Medication:              Celexa 40 mg daily  Symptoms: this is well controlled on present med regimen.        GERD:  Medication:              Omeprazole 40 mg daily  Symptoms: occasional gas and belching, but pretty well controlled.        BPH:  Medication:              Finasteride 5 mg daily  Symptoms: some urinary hesitancy. Nocturia 2-3x nightly. PSA: this has not been measured in a long time. height is 6' (1.829 m) and weight is 211 lb (95.7 kg). His temporal temperature is 97.6 °F (36.4 °C). His blood pressure is 134/82 and his pulse is 68. His oxygen saturation is 98%. Body mass index is 28.62 kg/m². I have reviewed the following with the Mr. Fabienne Hendricks   Lab Review  Orders Only on 04/27/2020   Component Date Value    Protime 04/25/2020 23.1*    INR 04/25/2020 2.3    Orders Only on 04/17/2020   Component Date Value    Protime 04/17/2020 15.0*    INR 04/17/2020 1.5*   Orders Only on 04/13/2020   Component Date Value    Protime 04/13/2020 16.5*    INR 04/13/2020 1.6*   Orders Only on 04/06/2020   Component Date Value    Protime 04/06/2020 10.1     INR 04/06/2020 1.0*   No results displayed because visit has over 200 results. Hospital Outpatient Visit on 03/16/2020   Component Date Value    Left Ventricular Ejectio* 03/16/2020 58     LVEF MODALITY 03/16/2020 ECHO    Orders Only on 03/02/2020   Component Date Value    Protime 03/02/2020 28.6*    INR 03/02/2020 2.61*     Copies of these are in the chart.     Current Outpatient Medications   Medication Sig Dispense Refill    clopidogrel (PLAVIX) 75 MG tablet Take 1  A-V CARDIAC PACEMAKER INSERTION  2020    SA node dysfunction-Dr. Gavin Flores APPENDECTOMY      CARDIAC CATHETERIZATION  10/3/11    CARDIAC CATHETERIZATION  1987    Normal left ventricular function and hemodynamics , Normal coronary arteriograms    CHOLECYSTECTOMY      COLONOSCOPY      CORONARY ANGIOPLASTY WITH STENT PLACEMENT      DIAGNOSTIC CARDIAC CATH LAB PROCEDURE  3/24/10    EF over 60%  Normal left ventricular function and hemodynamics, Diffuse nonocclusive CAD , w/ a patent stent in the circumflex marginal branch    DIAGNOSTIC CARDIAC CATH LAB PROCEDURE  10/28/08    EF 60% Normal LV function and hemodynamics, Diffuse non-occlusive CAD     DIAGNOSTIC CARDIAC CATH LAB PROCEDURE  07    EF over 60% Normal LV chamber size and wall motion, Diffuse nonocclusive CAD     DIAGNOSTIC CARDIAC CATH LAB PROCEDURE  06    EF over 60%  Normal LV function and hemodynamics, Severe two- vessel CAD , Successful percutaneous coronary intevention w/cuttng balloon angioplasty to a small second circumflex marginal branch and primary stent placement using a drug eluting stent to the diagonal branch of the LAD     DIAGNOSTIC CARDIAC CATH LAB PROCEDURE  03    EF over 60% Normal LV function and hemodynamics Mild diffuse nonocclusive CAD w/o hemodynamically significant lesions identified     ENDOSCOPY, COLON, DIAGNOSTIC      EYE SURGERY      FRACTURE SURGERY         Social History     Tobacco Use    Smoking status: Former Smoker     Packs/day: 2.00     Years: 40.00     Pack years: 80.00     Last attempt to quit: 1989     Years since quittin.7    Smokeless tobacco: Former User     Types: Chew   Substance Use Topics    Alcohol use: No        Review of Systems   Constitutional: Negative for activity change, chills, fatigue and fever. HENT: Negative for congestion, ear pain, nosebleeds, sinus pressure, sore throat and trouble swallowing.     Eyes: Negative for pain, discharge, redness and itching. Respiratory: Negative for cough, chest tightness, shortness of breath and wheezing. Cardiovascular: Negative for chest pain, palpitations and leg swelling. Gastrointestinal: Negative for abdominal pain, blood in stool, constipation, diarrhea, nausea and vomiting. Endocrine: Negative for polydipsia, polyphagia and polyuria. Genitourinary: Negative for frequency, hematuria and urgency. Musculoskeletal: Negative for back pain, joint swelling and neck pain. Skin: Negative for rash and wound. Allergic/Immunologic: Negative for environmental allergies and food allergies. Neurological: Negative for seizures, syncope, weakness and headaches. Hematological: Negative for adenopathy. Does not bruise/bleed easily. Psychiatric/Behavioral: Negative for agitation, confusion, hallucinations, self-injury and sleep disturbance. Physical Exam  Constitutional:       General: He is not in acute distress. Appearance: He is well-developed. HENT:      Head: Normocephalic and atraumatic. Right Ear: Tympanic membrane, ear canal and external ear normal.      Left Ear: Tympanic membrane, ear canal and external ear normal. Decreased hearing noted. Nose: Nose normal.      Mouth/Throat:      Mouth: Mucous membranes are moist.   Eyes:      General: No scleral icterus. Extraocular Movements: Extraocular movements intact. Conjunctiva/sclera: Conjunctivae normal.      Pupils: Pupils are equal, round, and reactive to light. Neck:      Musculoskeletal: Normal range of motion and neck supple. Thyroid: No thyromegaly. Vascular: No carotid bruit or JVD. Cardiovascular:      Rate and Rhythm: Normal rate and regular rhythm. No extrasystoles are present. Chest Wall: PMI is not displaced. Heart sounds: Normal heart sounds, S1 normal and S2 normal. No murmur. No friction rub. No gallop. Pulmonary:      Effort: Pulmonary effort is normal. No respiratory distress. Breath sounds: Normal breath sounds. No wheezing, rhonchi or rales. Abdominal:      General: Bowel sounds are normal.      Palpations: Abdomen is soft. Tenderness: There is no abdominal tenderness. There is no guarding or rebound. Genitourinary:     Comments: Exam deferred  Musculoskeletal: Normal range of motion. General: No tenderness. Comments: Heberden's and chris's nodes   Lymphadenopathy:      Cervical: No cervical adenopathy. Skin:     General: Skin is warm and dry. Capillary Refill: Capillary refill takes less than 2 seconds. Findings: No rash. Neurological:      Mental Status: He is alert and oriented to person, place, and time. Sensory: No sensory deficit (no numbness or tingling). Psychiatric:         Mood and Affect: Mood normal.         Behavior: Behavior normal.         Thought Content: Thought content normal.             ASSESSMENT      ICD-10-CM    1. Essential hypertension I10 CBC Auto Differential     Comprehensive Metabolic Panel     Microalbumin / Creatinine Urine Ratio   2. Mixed hyperlipidemia E78.2 Lipid Panel   3. Coronary artery disease involving native coronary artery of native heart without angina pectoris I25.10 clopidogrel (PLAVIX) 75 MG tablet     Microalbumin / Creatinine Urine Ratio   4. S/P coronary artery stent placement Z95.5 clopidogrel (PLAVIX) 75 MG tablet   5. Paroxysmal atrial fibrillation (HCC) I48.0 TSH without Reflex     Protime-INR   6. SVT (supraventricular tachycardia) (HCC) I47.1 TSH without Reflex   7. Gastroesophageal reflux disease, esophagitis presence not specified K21.9    8. Anxiety and depression F41.9     F32.9    9. Benign prostatic hyperplasia with urinary hesitancy N40.1 Psa screening    R39.11    10. Encounter for screening for malignant neoplasm of prostate  Z12.5 Psa screening         PLAN    1. Essential hypertension  This is presently at an acceptable level.   We will need to continue to monitor  - CBC Auto

## 2020-05-12 NOTE — TELEPHONE ENCOUNTER
Called and spoke with wife. Asked her to please come today before 4 and didn't know if she could make it.  I said if not, needs to be in the am before 10

## 2020-05-12 NOTE — TELEPHONE ENCOUNTER
pts wife called, she thinks he is getting too much blood thinner. His nose and ear are both bleeding. His INR on 4/27 was 2. 3. you want to recheck it?

## 2020-05-13 NOTE — TELEPHONE ENCOUNTER
Notes recorded by Katy Oliver DO on 5/13/2020 at 2:10 PM CDT  PSA is at the lower edge of detectable limit. Thyroid is slightly improved from what it was a year ago. Cholesterol is excellently controlled. Metabolic profile is significantly improved. Potassium is normal.  Glucose was 134 at the time of the lab draw. Coumadin level is perfect. Continue the same dose and recheck in 1 month  Your WBC, (infection fighting ability) Hgb and Hct, (oxygen carrying cells) are normal; as is your percentage of each cell type.            ------    Notes recorded by CONNOR Colvin on 5/13/2020 at 12:22 PM CDT  Please call patient and let them know results.    Blood counts are normal

## 2020-07-09 NOTE — PROGRESS NOTES
1719 [facility], [address]  Phone [phone]   Fax [phone]      OFFICE VISIT: 2020    Yi Dickinsondesmond-: 1940      HPI  Reason For Visit:  Regino Gant is a 78 y.o. Hematuria (noticed blood in his urine yesterday but none today.)    Patient presents on follow-up on in person visit. He states that he noticed blood in his urine yesterday he has not noticed any today. He was able to give us a urine specimen today. UA did show:   Large blood    Trace protein   Trace leuk. Blood pressure is excellent today  No history of any trauma . He is on Coumadin anticoagulation  Most recent INR was on 2020 and was 2.21  He states that he is bleeding very readily recently. Most recent metabolic profile was on 3/93/0382 with a BUN of 19 and a creatinine 0.8 (GFR > 60). He has never had hematuria before. No recent URI symptoms  He was a heavy smoker in the past.     height is 6' (1.829 m) and weight is 211 lb (95.7 kg). His temporal temperature is 96.4 °F (35.8 °C). His blood pressure is 110/70 and his pulse is 66. His oxygen saturation is 97%. Body mass index is 28.62 kg/m².     I have reviewed the following with the Mr. Everett Sarkar   Lab Review  Orders Only on 2020   Component Date Value    Protime 2020 25.0*    INR 2020 2.21*   Orders Only on 2020   Component Date Value    Protime 2020 27.7*    INR 2020 2.51*    PSA 2020 0.12     TSH 2020 4.700*    Cholesterol, Total 2020 138*    Triglycerides 2020 93     HDL 2020 55     LDL Calculated 2020 64     Sodium 2020 140     Potassium 2020 4.2     Chloride 2020 101     CO2 2020 24     Anion Gap 2020 15     Glucose 2020 134*    BUN 2020 19     CREATININE 2020 0.8     GFR Non- 2020 >60     Calcium 2020 9.1     Total Protein 2020 7.2     Alb 05/13/2020 4.3     Total Bilirubin 05/13/2020 0.4     Alkaline Phosphatase 05/13/2020 144*    ALT 05/13/2020 17     AST 05/13/2020 18     WBC 05/13/2020 8.3     RBC 05/13/2020 4.99     Hemoglobin 05/13/2020 14.1     Hematocrit 05/13/2020 45.4     MCV 05/13/2020 91.0     MCH 05/13/2020 28.3     MCHC 05/13/2020 31.1*    RDW 05/13/2020 14.3     Platelets 28/36/3723 265     MPV 05/13/2020 9.1*    Neutrophils % 05/13/2020 66.8*    Lymphocytes % 05/13/2020 20.2     Monocytes % 05/13/2020 10.2*    Eosinophils % 05/13/2020 1.7     Basophils % 05/13/2020 0.6     Neutrophils Absolute 05/13/2020 5.5     Immature Granulocytes # 05/13/2020 0.0     Lymphocytes Absolute 05/13/2020 1.7     Monocytes Absolute 05/13/2020 0.90     Eosinophils Absolute 05/13/2020 0.10     Basophils Absolute 05/13/2020 0.10    Orders Only on 04/27/2020   Component Date Value    Protime 04/25/2020 23.1*    INR 04/25/2020 2.3    Orders Only on 04/17/2020   Component Date Value    Protime 04/17/2020 15.0*    INR 04/17/2020 1.5*   Orders Only on 04/13/2020   Component Date Value    Protime 04/13/2020 16.5*    INR 04/13/2020 1.6*   Orders Only on 04/06/2020   Component Date Value    Protime 04/06/2020 10.1     INR 04/06/2020 1.0*   No results displayed because visit has over 200 results. Hospital Outpatient Visit on 03/16/2020   Component Date Value    Left Ventricular Ejectio* 03/16/2020 58     LVEF MODALITY 03/16/2020 ECHO    Orders Only on 03/02/2020   Component Date Value    Protime 03/02/2020 28.6*    INR 03/02/2020 2.61*     Copies of these are in the chart.     Current Outpatient Medications   Medication Sig Dispense Refill    nitrofurantoin, macrocrystal-monohydrate, (MACROBID) 100 MG capsule Take 1 capsule by mouth 2 times daily for 10 days 20 capsule 0    clopidogrel (PLAVIX) 75 MG tablet Take 1 tablet by mouth daily 90 tablet 3    warfarin (COUMADIN) 4 MG tablet Take 4 mg by mouth 2 times daily      Normal left ventricular function and hemodynamics, Diffuse nonocclusive CAD , w/ a patent stent in the circumflex marginal branch    DIAGNOSTIC CARDIAC CATH LAB PROCEDURE  10/28/08    EF 60% Normal LV function and hemodynamics, Diffuse non-occlusive CAD     DIAGNOSTIC CARDIAC CATH LAB PROCEDURE  07    EF over 60% Normal LV chamber size and wall motion, Diffuse nonocclusive CAD     DIAGNOSTIC CARDIAC CATH LAB PROCEDURE  06    EF over 60%  Normal LV function and hemodynamics, Severe two- vessel CAD , Successful percutaneous coronary intevention w/cuttng balloon angioplasty to a small second circumflex marginal branch and primary stent placement using a drug eluting stent to the diagonal branch of the LAD     DIAGNOSTIC CARDIAC CATH LAB PROCEDURE  03    EF over 60% Normal LV function and hemodynamics Mild diffuse nonocclusive CAD w/o hemodynamically significant lesions identified     ENDOSCOPY, COLON, DIAGNOSTIC      EYE SURGERY      FRACTURE SURGERY         Social History     Tobacco Use    Smoking status: Former Smoker     Packs/day: 2.00     Years: 40.00     Pack years: 80.00     Last attempt to quit: 1989     Years since quittin.9    Smokeless tobacco: Former User     Types: Chew   Substance Use Topics    Alcohol use: No        Review of Systems   Constitutional: Negative for activity change, chills, fatigue and fever. HENT: Negative for congestion, ear pain, nosebleeds, sinus pressure, sore throat and trouble swallowing. Eyes: Negative for pain, discharge, redness and itching. Respiratory: Negative for cough, chest tightness, shortness of breath and wheezing. Cardiovascular: Negative for chest pain, palpitations and leg swelling. Gastrointestinal: Negative for abdominal pain, blood in stool, constipation, diarrhea, nausea and vomiting. Endocrine: Negative for polydipsia, polyphagia and polyuria. Genitourinary: Negative for frequency, hematuria and urgency. Musculoskeletal: Negative for back pain, joint swelling and neck pain. Skin: Negative for rash and wound. Allergic/Immunologic: Negative for environmental allergies and food allergies. Neurological: Negative for seizures, syncope, weakness and headaches. Hematological: Negative for adenopathy. Does not bruise/bleed easily. Psychiatric/Behavioral: Negative for agitation, confusion, hallucinations, self-injury and sleep disturbance. Physical Exam  Constitutional:       General: He is not in acute distress. Appearance: Normal appearance. He is well-developed. He is not ill-appearing. HENT:      Head: Normocephalic and atraumatic. Right Ear: Tympanic membrane, ear canal and external ear normal.      Left Ear: Tympanic membrane, ear canal and external ear normal. Decreased hearing noted. Nose: Nose normal.      Mouth/Throat:      Mouth: Mucous membranes are moist.      Pharynx: Oropharynx is clear. Eyes:      General: No scleral icterus. Extraocular Movements: Extraocular movements intact. Conjunctiva/sclera: Conjunctivae normal.      Pupils: Pupils are equal, round, and reactive to light. Neck:      Musculoskeletal: Normal range of motion and neck supple. Thyroid: No thyromegaly. Vascular: No carotid bruit or JVD. Cardiovascular:      Rate and Rhythm: Normal rate and regular rhythm. No extrasystoles are present. Chest Wall: PMI is not displaced. Heart sounds: Normal heart sounds, S1 normal and S2 normal. No murmur. No friction rub. No gallop. Pulmonary:      Effort: Pulmonary effort is normal. No respiratory distress. Breath sounds: Normal breath sounds. No wheezing, rhonchi or rales. Abdominal:      General: Bowel sounds are normal.      Palpations: Abdomen is soft. Tenderness: There is no abdominal tenderness. There is no right CVA tenderness, left CVA tenderness, guarding or rebound.    Genitourinary:     Comments: Exam deferred  Musculoskeletal: Normal range of motion. General: No tenderness. Comments: Heberden's and chris's nodes   Lymphadenopathy:      Cervical: No cervical adenopathy. Skin:     General: Skin is warm and dry. Capillary Refill: Capillary refill takes less than 2 seconds. Findings: No rash. Comments: Multiple areas of ecchymosis     Neurological:      Mental Status: He is alert and oriented to person, place, and time. Sensory: No sensory deficit (no numbness or tingling). Psychiatric:         Mood and Affect: Mood normal.         Behavior: Behavior normal.         Thought Content: Thought content normal.             ASSESSMENT      ICD-10-CM    1. Hematuria, unspecified type R31.9 Protime-INR     Comprehensive Metabolic Panel     CBC Auto Differential     [facility] Kawasaki, MD, Urology, [place]     US RENAL COMPLETE     Culture, Urine   2. Dysuria R30.0 Culture, Urine   3. Acute cystitis with hematuria N30.01 nitrofurantoin, macrocrystal-monohydrate, (MACROBID) 100 MG capsule         PLAN    1. Hematuria, unspecified type  Will work up   Treat for uti today with macrobid    - Protime-INR; Future  - Comprehensive Metabolic Panel; Future  - CBC Auto Differential; Future    2. Dysuria  Will treat with abx    3. Acute cystitis with hematuria  As above. Orders Placed This Encounter   Procedures    Culture, Urine    US RENAL COMPLETE    Protime-INR    Comprehensive Metabolic Panel    CBC Auto Differential    [facility] Kaw[facility], MD, Urology, [facility]        Return if symptoms worsen or fail to improve. This was an in-house visit.

## 2020-07-09 NOTE — PATIENT INSTRUCTIONS
Patient Education        Painful Urination (Dysuria): Care Instructions  Your Care Instructions  Burning pain with urination (dysuria) is a common symptom of a urinary tract infection or other urinary problems. The bladder may become inflamed. This can cause pain when the bladder fills and empties. You may also feel pain if the tube that carries urine from the bladder to the outside of the body (urethra) gets irritated or infected. Sexually transmitted infections (STIs) also may cause pain when you urinate. Sometimes the pain can be caused by things other than an infection. The urethra can be irritated by soaps, perfumes, or foreign objects in the urethra. Kidney stones can cause pain when they pass through the urethra. The cause may be hard to find. You may need tests. Treatment for painful urination depends on the cause. Follow-up care is a key part of your treatment and safety. Be sure to make and go to all appointments, and call your doctor if you are having problems. It's also a good idea to know your test results and keep a list of the medicines you take. How can you care for yourself at home? · Drink extra water for the next day or two. This will help make the urine less concentrated. (If you have kidney, heart, or liver disease and have to limit fluids, talk with your doctor before you increase the amount of fluids you drink.)  · Avoid drinks that are carbonated or have caffeine. They can irritate the bladder. · Urinate often. Try to empty your bladder each time. For women:  · Urinate right after you have sex. · After going to the bathroom, wipe from front to back. · Avoid douches, bubble baths, and feminine hygiene sprays. And avoid other feminine hygiene products that have deodorants. When should you call for help? Call your doctor now or seek immediate medical care if:  · You have new symptoms, such as fever, nausea, or vomiting. · You have new or worse symptoms of a urinary problem.  For are caused by a harmless condition that runs in families. This is called benign familial hematuria. It does not need any treatment. Sometimes your urine may look red or brown even though it does not contain blood. For example, not getting enough fluids (dehydration), taking certain medicines, or having a liver problem can change the color of your urine. Eating foods such as beets, rhubarb, or blackberries or foods with red food coloring can make your urine look red or pink. Follow-up care is a key part of your treatment and safety. Be sure to make and go to all appointments, and call your doctor if you are having problems. It's also a good idea to know your test results and keep a list of the medicines you take. When should you call for help? Call your doctor now or seek immediate medical care if:  · You have symptoms of a urinary infection. For example:  ? You have pus in your urine. ? You have pain in your back just below your rib cage. This is called flank pain. ? You have a fever, chills, or body aches. ? It hurts to urinate. ? You have groin or belly pain. · You have more blood in your urine. Watch closely for changes in your health, and be sure to contact your doctor if:  · You have new urination problems. · You do not get better as expected. Where can you learn more? Go to [email or web address] org and sign in to your [employer] account. Enter B578 in the [facility] box to learn more about \"Blood in the Urine: Care Instructions. \"     If you do not have an account, please click on the \"Sign Up Now\" link. Current as of: August 22, 2019               Content Version: 12.5  © 4171-8773 Healthwise, Incorporated. Care instructions adapted under license by [facility] ([facility]).  If you have questions about a medical condition or this instruction, always ask your healthcare professional. [address] any warranty or liability for your use of this information. Patient Education        Urinary Tract Infections in Men: Care Instructions  Your Care Instructions     A urinary tract infection, or UTI, is a general term for an infection anywhere between the kidneys and the tip of the penis. UTIs can also be a result of a prostate problem. Most cause pain or burning when you urinate. Most UTIs are caused by bacteria and can be cured with antibiotics. It is important to complete your treatment so that the infection does not get worse. Follow-up care is a key part of your treatment and safety. Be sure to make and go to all appointments, and call your doctor if you are having problems. It's also a good idea to know your test results and keep a list of the medicines you take. How can you care for yourself at home? · Take your antibiotics as prescribed. Do not stop taking them just because you feel better. You need to take the full course of antibiotics. · Take your medicines exactly as prescribed. Your doctor may have prescribed a medicine, such as phenazopyridine (Pyridium), to help relieve pain when you urinate. This turns your urine orange. You may stop taking it when your symptoms get better. But be sure to take all of your antibiotics, which treat the infection. · Drink extra water for the next day or two. This will help make the urine less concentrated and help wash out the bacteria causing the infection. (If you have kidney, heart, or liver disease and have to limit your fluids, talk with your doctor before you increase your fluid intake.)  · Avoid drinks that are carbonated or have caffeine. They can irritate the bladder. · Urinate often. Try to empty your bladder each time. · To relieve pain, take a hot bath or lay a heating pad (set on low) over your lower belly or genital area. Never go to sleep with a heating pad in place. To help prevent UTIs  · Drink plenty of fluids, enough so that your urine is light yellow or clear like water.  If you have kidney, heart, or liver disease and have to limit fluids, talk with your doctor before you increase the amount of fluids you drink. · Urinate when you have the urge. Do not hold your urine for a long time. Urinate before you go to sleep. · Keep your penis clean. Catheter care  If you have a drainage tube (catheter) in place, the following steps will help you care for it. · Always wash your hands before and after touching your catheter. · Check the area around the urethra for inflammation or signs of infection. Signs of infection include irritated, swollen, red, or tender skin, or pus around the catheter. · Clean the area around the catheter with soap and water two times a day. Dry with a clean towel afterward. · Do not apply powder or lotion to the skin around the catheter. To empty the urine collection bag   · Wash your hands with soap and water. · Without touching the drain spout, remove the spout from its sleeve at the bottom of the collection bag. Open the valve on the spout. · Let the urine flow out of the bag and into the toilet or a container. Do not let the tubing or drain spout touch anything. · After you empty the bag, clean the end of the drain spout with tissue and water. Close the valve and put the drain spout back into its sleeve at the bottom of the collection bag. · Wash your hands with soap and water. When should you call for help? Call your doctor now or seek immediate medical care if:  · Symptoms such as a fever, chills, nausea, or vomiting get worse or happen for the first time. · You have new pain in your back just below your rib cage. This is called flank pain. · There is new blood or pus in your urine. · You are not able to take or keep down your antibiotics. Watch closely for changes in your health, and be sure to contact your doctor if:  · You are not getting better after taking an antibiotic for 2 days. · Your symptoms go away but then come back.   Where can you

## 2020-07-10 NOTE — TELEPHONE ENCOUNTER
----- Message from CONNOR Cooney sent at 7/10/2020  4:06 PM CDT -----  Please inform patient results show  microalbumin cr is normal

## 2020-07-10 NOTE — PROGRESS NOTES
Called patient, spoke with: Spouse regarding the results of the patients most recent PT/INR. I advised Spouse of Dr. Pavithra Plascencia recommendations. Spouse did voice understanding    DO SIMEON Caballero Staff             Coumadin level is slightly low. If you have been taking your Coumadin regularly, then I would recommend increasing your Coumadin by about a half a milligram.   Recheck INR in 1 week.

## 2020-07-10 NOTE — TELEPHONE ENCOUNTER
Called patient, spoke with: Spouse regarding the results of the patients most recent labs and [address]. I advised Spouse of Radha Villarreal recommendations.    Spouse did voice understanding

## 2020-07-10 NOTE — TELEPHONE ENCOUNTER
----- Message from CONNOR Childress sent at 7/10/2020  4:06 PM CDT -----  Please inform patient results show  Us is normal . Looks like a referral was made to urology.

## 2020-07-10 NOTE — TELEPHONE ENCOUNTER
----- Message from Daryle Kelch, APRN sent at 7/10/2020  4:05 PM CDT -----  Please inform patient results show  US is normal

## 2020-07-10 NOTE — TELEPHONE ENCOUNTER
----- Message from [address] [facility], DO sent at 7/9/2020  5:56 PM CDT -----  Coumadin level is slightly low. If you have been taking your Coumadin regularly, then I would recommend increasing your Coumadin by about a half a milligram.  Recheck INR in 1 week. Your WBC, (infection fighting ability) Hgb and Hct, (oxygen carrying cells) are normal; as is your percentage of each cell type.

## 2020-07-13 NOTE — TELEPHONE ENCOUNTER
----- Message from CONNOR Jose sent at 7/13/2020  8:59 AM CDT -----  Please call patient and let them know results.    Urine culture negative

## 2020-07-13 NOTE — TELEPHONE ENCOUNTER
Sent patient a My Chart message with normal lab/test results. Asked that patient contact the office with any questions or concerns.

## 2020-07-27 PROBLEM — I47.29 NSVT (NONSUSTAINED VENTRICULAR TACHYCARDIA) (HCC): Status: ACTIVE | Noted: 2020-01-01

## 2020-07-27 PROBLEM — I44.1 AV BLOCK, MOBITZ 1: Status: ACTIVE | Noted: 2020-01-01

## 2020-07-27 PROBLEM — Z79.01 CHRONIC ANTICOAGULATION: Status: ACTIVE | Noted: 2020-01-01

## 2020-07-27 NOTE — PROGRESS NOTES
[facility] Progress Note                            Date:  7/27/2020  Patient: Dandy Hong  Age:  78 y. o., 1940      Reason for evaluation:         SUBJECTIVE:    Returns today follow-up assessment head pacemaker implant 3/25/2020. Wound is well-healed he was a little concerned about indentation over the wound which is just the leads that he is feeling. Denies anginal chest pain chronic dyspnea stable denies any bleeding on warfarin. Really no complaints occasional palpitations. Underwent percutaneous intervention 3/21/2020 stent placement right coronary artery good angiographic results. Echocardiogram 3/16/2020 ejection fraction 55 to 60% trace MR mild TR left atrial large man. Dobutamine stress echo abnormal 3/16/2020. Blood pressure stable 126/84 heart 76. Review of Systems   Constitutional: Negative. Negative for chills, fever and unexpected weight change. HENT: Negative. Eyes: Negative. Respiratory: Negative. Negative for shortness of breath. Cardiovascular: Negative. Negative for chest pain. Gastrointestinal: Negative. Negative for diarrhea, nausea and vomiting. Endocrine: Negative. Genitourinary: Negative. Musculoskeletal: Negative. Skin: Negative. Neurological: Negative. All other systems reviewed and are negative. OBJECTIVE:     /84   Pulse 76   Ht 6' (1.829 m)   Wt 217 lb (98.4 kg)   BMI 29.43 kg/m²     Labs:   CBC: No results for input(s): WBC, HGB, HCT, PLT in the last 72 hours. BMP:No results for input(s): NA, K, CO2, BUN, CREATININE, LABGLOM, GLUCOSE in the last 72 hours. BNP: No results for input(s): BNP in the last 72 hours. PT/INR: No results for input(s): PROTIME, INR in the last 72 hours. APTT:No results for input(s): APTT in the last 72 hours. CARDIAC ENZYMES:No results for input(s): CKTOTAL, CKMB, CKMBINDEX, TROPONINI in the last 72 hours.   FASTING LIPID PANEL:  Lab Results   Component Value Date    HDL 55 05/13/2020    LDLCALC 64 05/13/2020    TRIG 93 05/13/2020     LIVER PROFILE:No results for input(s): AST, ALT, LABALBU in the last 72 hours. Past Medical History:   Diagnosis Date    Anxiety     Arthritis     CAD (coronary artery disease)     Gastroesophageal reflux disease     Hyperlipidemia     Cholesterol management per pcp, EDITH Garrett Ernie Hypertension     Insomnia     Mitral valve disorder     Pancreatitis     SVT (supraventricular tachycardia) (HCC)      Past Surgical History:   Procedure Laterality Date    A-V CARDIAC PACEMAKER INSERTION  03/25/2020    SA node dysfunction-Dr. Dillon Boast APPENDECTOMY      CARDIAC CATHETERIZATION  10/3/11    CARDIAC CATHETERIZATION  9/1/1987    Normal left ventricular function and hemodynamics , Normal coronary arteriograms    CHOLECYSTECTOMY      COLONOSCOPY      CORONARY ANGIOPLASTY WITH STENT PLACEMENT      DIAGNOSTIC CARDIAC CATH LAB PROCEDURE  3/24/10    EF over 60%  Normal left ventricular function and hemodynamics, Diffuse nonocclusive CAD , w/ a patent stent in the circumflex marginal branch    DIAGNOSTIC CARDIAC CATH LAB PROCEDURE  10/28/08    EF 60% Normal LV function and hemodynamics, Diffuse non-occlusive CAD     DIAGNOSTIC CARDIAC CATH LAB PROCEDURE  5/8/07    EF over 60% Normal LV chamber size and wall motion, Diffuse nonocclusive CAD     DIAGNOSTIC CARDIAC CATH LAB PROCEDURE  4/11/06    EF over 60%  Normal LV function and hemodynamics, Severe two- vessel CAD , Successful percutaneous coronary intevention w/cuttng balloon angioplasty to a small second circumflex marginal branch and primary stent placement using a drug eluting stent to the diagonal branch of the LAD     DIAGNOSTIC CARDIAC CATH LAB PROCEDURE  9/9/03    EF over 60% Normal LV function and hemodynamics Mild diffuse nonocclusive CAD w/o hemodynamically significant lesions identified     ENDOSCOPY, COLON, DIAGNOSTIC      EYE SURGERY      FRACTURE SURGERY Family History   Problem Relation Age of Onset    Hypertension Mother     Cancer Father     Diabetes Brother      Allergies   Allergen Reactions    Pcn [Penicillins]      Current Outpatient Medications   Medication Sig Dispense Refill    clopidogrel (PLAVIX) 75 MG tablet Take 1 tablet by mouth daily 90 tablet 3    warfarin (COUMADIN) 4 MG tablet Take 4 mg by mouth 2 times daily      warfarin (COUMADIN) 4 MG tablet Take 2 tablets by mouth daily 60 tablet 3    warfarin (COUMADIN) 1 MG tablet Take 1 tablet by mouth daily 30 tablet 3    aspirin 81 MG EC tablet Take 1 tablet by mouth daily 30 tablet 3    atorvastatin (LIPITOR) 80 MG tablet Take 1 tablet by mouth nightly 30 tablet 3    fluticasone (FLONASE) 50 MCG/ACT nasal spray 2 sprays by Nasal route daily 48 g 5    finasteride (PROSCAR) 5 MG tablet Take 1 tablet by mouth daily (Patient taking differently: Take 5 mg by mouth nightly ) 90 tablet 3    omeprazole (PRILOSEC) 40 MG delayed release capsule TAKE 1 CAPSULE BY MOUTH ONCE DAILY 90 capsule 3    lisinopril (PRINIVIL;ZESTRIL) 10 MG tablet Take 1 tablet by mouth daily (Patient taking differently: Take 10 mg by mouth nightly ) 90 tablet 3    citalopram (CELEXA) 40 MG tablet Take 1 tablet by mouth daily (Patient taking differently: Take 40 mg by mouth nightly ) 30 tablet 11     No current facility-administered medications for this visit.       Social History     Socioeconomic History    Marital status:      Spouse name: Nahid Rubalcava    Number of children: 1    Years of education: 8th grade    Highest education level: Not on file   Occupational History    Occupation:    Social Needs    Financial resource strain: Not on file    Food insecurity     Worry: Not on file     Inability: Not on file   [employer] needs     Medical: Not on file     Non-medical: Not on file   Tobacco Use    Smoking status: Former Smoker     Packs/day: 2.00     Years: 40.00     Pack years: 80.00 Last attempt to quit: 1989     Years since quittin.0    Smokeless tobacco: Former User     Types: Chew   Substance and Sexual Activity    Alcohol use: No    Drug use: No    Sexual activity: Never   Lifestyle    Physical activity     Days per week: Not on file     Minutes per session: Not on file    Stress: Not on file   Relationships    Social connections     Talks on phone: Not on file     Gets together: Not on file     Attends Rastafari service: Not on file     Active member of club or organization: Not on file     Attends meetings of clubs or organizations: Not on file     Relationship status: Not on file    Intimate partner violence     Fear of current or ex partner: Not on file     Emotionally abused: Not on file     Physically abused: Not on file     Forced sexual activity: Not on file   Other Topics Concern    Not on file   Social History Narrative     58 years only marriage    He has 1 daughter    Retired [employer]    Never in the [employer] eighth grade    He does continue to drive an automobile    Walks poorly in recent years    Smoked previously 2 packs/day quit in  [facility] denies alcohol consumption or substance usage       Physical Examination:  /84   Pulse 76   Ht 6' (1.829 m)   Wt 217 lb (98.4 kg)   BMI 29.43 kg/m²   Physical Exam  Vitals signs reviewed. Constitutional:       Appearance: He is well-developed. Neck:      Vascular: No carotid bruit or JVD. Cardiovascular:      Rate and Rhythm: Normal rate and regular rhythm. Heart sounds: Normal heart sounds. No murmur. No friction rub. No gallop. Pulmonary:      Effort: Pulmonary effort is normal. No respiratory distress. Breath sounds: Normal breath sounds. No wheezing or rales. Abdominal:      General: There is no distension. Tenderness: There is no abdominal tenderness. Lymphadenopathy:      Cervical: No cervical adenopathy.    Skin:     General: Skin is warm and dry. ASSESSMENT:     Diagnosis Orders   1. NSVT (nonsustained ventricular tachycardia) ([address]     2. Pacemaker     3. Bradycardia     4. Paroxysmal atrial fibrillation (HCC)     5. Coronary artery disease involving native coronary artery of native heart without angina pectoris     6. Mixed hyperlipidemia     7. S/P coronary artery stent placement     8. AV block, [address]     9. Essential hypertension     10. Chronic anticoagulation         PLAN:  No orders of the defined types were placed in this encounter. No orders of the defined types were placed in this encounter. 1. Continue present medications  2. Recommend follow-up assessment in 6 months    Return in about 6 months (around 1/27/2021) for return to Dr. Roseann Kanner only. Marleni Sauceda MD 7/27/2020 1:18 PM CDT    [facility]      Thisdictation was generated by voice recognition computer software. Although all attempts are made to edit the dictation for accuracy, there may be errors in the transcription that are not intended.

## 2020-07-27 NOTE — PROGRESS NOTES
Pacemaker interrogated  Presenting rhythm:  AS , AP 61.1%,  99.5%  Battey voltage 11.4 years  Lead status:  Lead impedance within range and stable  Sensing:  P waves 2.9 mV,  R waves 5.9 mV  Thresholds:  Atrial 0.5V @ 0.4ms, ventricular 0.5@ 0.4ms  Observations:  1 monitored VT  19 monitored At/Af, time in At/AF 0.1%  Reprogramming for sensitivity and threshold testing  Next [facility] appointment:  10/28/20

## 2020-08-13 NOTE — PROGRESS NOTES
Diagnosis Date    Anxiety     Arthritis     CAD (coronary artery disease)     Gastroesophageal reflux disease     Hyperlipidemia     Cholesterol management per pcp, EDITH Regan M.D.   [facility] Hypertension     Insomnia     Mitral valve disorder     Pancreatitis     SVT (supraventricular tachycardia) ([facility])        Past Surgical History:   Procedure Laterality Date    A-V CARDIAC PACEMAKER INSERTION  03/25/2020    SA node dysfunction-Dr. Ze Sr APPENDECTOMY      CARDIAC CATHETERIZATION  10/3/11    CARDIAC CATHETERIZATION  9/1/1987    Normal left ventricular function and hemodynamics , Normal coronary arteriograms    CHOLECYSTECTOMY      COLONOSCOPY      CORONARY ANGIOPLASTY WITH STENT PLACEMENT      DIAGNOSTIC CARDIAC CATH LAB PROCEDURE  3/24/10    EF over 60%  Normal left ventricular function and hemodynamics, Diffuse nonocclusive CAD , w/ a patent stent in the circumflex marginal branch    DIAGNOSTIC CARDIAC CATH LAB PROCEDURE  10/28/08    EF 60% Normal LV function and hemodynamics, Diffuse non-occlusive CAD     DIAGNOSTIC CARDIAC CATH LAB PROCEDURE  5/8/07    EF over 60% Normal LV chamber size and wall motion, Diffuse nonocclusive CAD     DIAGNOSTIC CARDIAC CATH LAB PROCEDURE  4/11/06    EF over 60%  Normal LV function and hemodynamics, Severe two- vessel CAD , Successful percutaneous coronary intevention w/cuttng balloon angioplasty to a small second circumflex marginal branch and primary stent placement using a drug eluting stent to the diagonal branch of the LAD     DIAGNOSTIC CARDIAC CATH LAB PROCEDURE  9/9/03    EF over 60% Normal LV function and hemodynamics Mild diffuse nonocclusive CAD w/o hemodynamically significant lesions identified     ENDOSCOPY, COLON, DIAGNOSTIC      EYE SURGERY      FRACTURE SURGERY         Family History   Problem Relation Age of Onset    Hypertension Mother     Cancer Father     Diabetes Brother        CareTeam (Including outside providers/suppliers regularly involved in providing care):   Patient Care Team:  Brandon Julian DO as PCP - General  B Ana Velasco DO as PCP - REHABILITATION [facility] Empaneled Provider  CONNOR Schulte - CNP as Nurse Practitioner (Nurse Practitioner Acute Care)  César Mcmullen MD as Consulting Physician (Interventional Cardiology)    Wt Readings from Last 3 Encounters:   08/13/20 215 lb (97.5 kg)   07/27/20 217 lb (98.4 kg)   07/09/20 211 lb (95.7 kg)     Vitals:    08/13/20 1343   BP: 139/79   Pulse: 77   Weight: 215 lb (97.5 kg)   Height: 6' (1.829 m)     Body mass index is 29.16 kg/m². Based upon direct observation of the patient, evaluation of cognition reveals recent and remote memory intact. Patient's complete Health Risk Assessment and screening values have been reviewed and are found in Flowsheets. The following problems were reviewed today and where indicated follow up appointments were made and/or referrals ordered. Positive Risk Factor Screenings with Interventions:     Fall Risk:  Timed Up and Go Test > 12 seconds? (Complete if either Fall Risk answers are Yes): no  2 or more falls in past year?: (!) yes(Balance issues.)  Fall with injury in past year?: no  Fall Risk Interventions:    · Home safety tips provided    General Health:  General  In general, how would you say your health is?: Good  In the past 7 days, have you experienced any of the following?  New or Increased Pain, New or Increased Fatigue, Loneliness, Social Isolation, Stress or Anger?: None of These  Do you get the social and emotional support that you need?: Yes  Do you have a Living Will?: (!) No  General Health Risk Interventions:  · No Living Will: provided the state-specific advance directive document to the patient    Health Habits/Nutrition:  Health Habits/Nutrition  Do you exercise for at least 20 minutes 2-3 times per week?: (!) No  Have you lost any weight without trying in the past 3 months?: No  Do you eat fewer than 2 meals per day?: No  Have you seen a dentist within the past year?: (!) No(Upper and lower dentures.)  Body mass index is 29.16 kg/m². Health Habits/Nutrition Interventions:  · Inadequate physical activity:  patient is not ready to increase his/her physical activity level at this time  · Dental exam overdue:  patient declines dental evaluation    Hearing/Vision:  No exam data present  Hearing/Vision  Do you or your family notice any trouble with your hearing?: (!) Yes  Do you have difficulty driving, watching TV, or doing any of your daily activities because of your eyesight?: (!) Yes(Has macular degeneration.)  Have you had an eye exam within the past year?: Yes  Hearing/Vision Interventions:  · Hearing concerns:  patient declines any further evaluation/treatment for hearing issues  · Vision concerns:  patient declines any further evaluation/treatment for this issue    ADL:  ADLs  In the past 7 days, did you need help from others to take care of any of the following?  Laundry, housekeeping, banking/finances, shopping, telephone use, food preparation, transportation, or taking medications?: (!) Taking Medications, Food Preparation, Shopping(Daughter and wife provide needs.)  ADL Interventions:  · Patient declines any further evaluation/treatment for this issue    Personalized Preventive Plan   Current Health Maintenance Status  Immunization History   Administered Date(s) Administered    Influenza Vaccine, unspecified formulation 11/01/2015    Influenza Virus Vaccine 11/01/2016, 11/01/2016    Influenza, Quadv, Recombinant, IM PF (Flublok 18 yrs and older) 10/15/2019    Pneumococcal Conjugate 13-valent (Sarah Moe) 10/12/2016    Pneumococcal Polysaccharide ([id number]) 10/14/2011        Health Maintenance   Topic Date Due    DTaP/Tdap/Td vaccine (1 - Tdap) [birth year]/1959    Shingles Vaccine (1 of 2) 11/30/1990    Annual Wellness Visit (AWV)  06/23/2019    Flu vaccine (1) 09/01/2020    Lipid screen  05/13/2021    Potassium monitoring  07/09/2021    Creatinine monitoring  07/09/2021    Pneumococcal 65+ years Vaccine  Completed    Hepatitis A vaccine  Aged Out    Hepatitis B vaccine  Aged Out    Hib vaccine  Aged Out    Meningococcal (ACWY) vaccine  Aged Out     Recommendations for [employer] Due: see orders and patient instructions/AVS.  Health Maintenance Plan reviewed with patient. .  Recommended screening schedule for the next 5-10 years is provided to the patient in written form: see Patient Instructions/AVS.    Jacquelin ANDERSEN LPN, [birth year], performed the documented evaluation under the direct supervision of the attending physician. Suzette Pelaez is a 78 y.o. male evaluated via telephone on 8/13/2020. Consent:  He and/or health care decision maker is aware that that he may receive a bill for this telephone service, depending on his insurance coverage, and has provided verbal consent to proceed: Yes      Documentation:  I communicated with the patient and/or health care decision maker about AWV. Details of this discussion including any medical advice provided. I affirm this is a Patient Initiated Episode with a Patient who has not had a related appointment within my department in the past 7 days or scheduled within the next 24 hours. Patient identification was verified at the start of the visit: Yes    Total Time: minutes: 21-30 minutes    Note: not billable if this call serves to triage the patient into an appointment for the relevant concern      Jacquelin Barlow       This encounter was performed under EDITH owen DOs, direct supervision, 8/13/2020.

## 2020-08-24 NOTE — TELEPHONE ENCOUNTER
Received fax from pharmacy requesting refill on pts medication(s). Pt was last seen in office on 8/13/2020  and has a follow up scheduled for 11/2/2020. Will send request to  Dr. Karine Kearns  for patient.      Requested Prescriptions     Pending Prescriptions Disp Refills    warfarin (COUMADIN) 4 MG tablet [Pharmacy Med Name: Warfarin Sodium 4 MG Oral Tablet] 60 tablet 0     Sig: Take 2 tablets by mouth once daily

## 2020-09-29 ENCOUNTER — TELEPHONE (OUTPATIENT)
Dept: CARDIOLOGY | Age: 80
End: 2020-09-29

## 2020-09-29 NOTE — TELEPHONE ENCOUNTER
Patients wife called and wanted to know what to do with patients carelink monitor. She stated he passed away last week. She stated he got that virus. Expressed sympathy for her loss. She stated she was tested and she is fine. Discontinued carelink monitoring and ordered a return kit to be shipped to patients wife to return carelink monitor.

## (undated) DEVICE — SENSR O2 OXIMAX FNGR A/ 18IN NONSTR

## (undated) DEVICE — MASK,OXYGEN,MED CONC,ADLT,7' TUB, UC: Brand: PENDING

## (undated) DEVICE — TBG SMPL FLTR LINE NASL 02/C02 A/ BX/100

## (undated) DEVICE — THE SINGLE USE ETRAP – POLYP TRAP IS USED FOR SUCTION RETRIEVAL OF ENDOSCOPICALLY REMOVED POLYPS.: Brand: ETRAP

## (undated) DEVICE — THE CHANNEL CLEANING BRUSH IS A NYLON FLEXI BRUSH ATTACHED TO A FLEXIBLE PLASTIC SHEATH DESIGNED TO SAFELY REMOVE DEBRIS FROM FLEXIBLE ENDOSCOPES.

## (undated) DEVICE — SNAR POLYP CAPTIVATOR MICROHEX 13 240CM

## (undated) DEVICE — YANKAUER,BULB TIP WITH VENT: Brand: ARGYLE

## (undated) DEVICE — FRCP BX RADJAW4 NDL 2.8 240 STD OG

## (undated) DEVICE — CONMED SCOPE SAVER BITE BLOCK, 20X27 MM: Brand: SCOPE SAVER

## (undated) DEVICE — ENDOGATOR AUXILIARY WATER JET CONNECTOR: Brand: ENDOGATOR

## (undated) DEVICE — Device: Brand: DEFENDO AIR/WATER/SUCTION AND BIOPSY VALVE

## (undated) DEVICE — CUFF,BP,DISP,1 TUBE,ADULT,HP: Brand: MEDLINE